# Patient Record
Sex: FEMALE | Race: WHITE | Employment: UNEMPLOYED | URBAN - METROPOLITAN AREA
[De-identification: names, ages, dates, MRNs, and addresses within clinical notes are randomized per-mention and may not be internally consistent; named-entity substitution may affect disease eponyms.]

---

## 2019-08-21 LAB
CHLAMYDIA, EXTERNAL: NEGATIVE
HBSAG, EXTERNAL: NEGATIVE
HIV, EXTERNAL: NEGATIVE
N. GONORRHEA, EXTERNAL: NEGATIVE
RPR, EXTERNAL: NONREACTIVE
RUBELLA, EXTERNAL: NORMAL
TYPE, ABO & RH, EXTERNAL: NORMAL

## 2019-11-22 ENCOUNTER — HOSPITAL ENCOUNTER (OUTPATIENT)
Age: 34
Discharge: HOME | End: 2019-11-22
Attending: OBSTETRICS & GYNECOLOGY | Admitting: OBSTETRICS & GYNECOLOGY
Payer: MEDICAID

## 2019-11-22 VITALS
TEMPERATURE: 97.7 F | RESPIRATION RATE: 18 BRPM | WEIGHT: 142 LBS | BODY MASS INDEX: 21.03 KG/M2 | HEIGHT: 69 IN | HEART RATE: 126 BPM | OXYGEN SATURATION: 99 % | DIASTOLIC BLOOD PRESSURE: 70 MMHG | SYSTOLIC BLOOD PRESSURE: 123 MMHG

## 2019-11-22 PROBLEM — Z34.90 PREGNANCY: Status: ACTIVE | Noted: 2019-11-22

## 2019-11-22 LAB
APPEARANCE UR: CLEAR
BILIRUB UR QL: NEGATIVE
COLOR UR: YELLOW
GLUCOSE UR QL STRIP.AUTO: NEGATIVE MG/DL
KETONES UR-MCNC: NEGATIVE MG/DL
LEUKOCYTE ESTERASE UR QL STRIP: NEGATIVE
NITRITE UR QL: NEGATIVE
PH UR: 7 [PH] (ref 5–9)
PROT UR QL: NEGATIVE MG/DL
RBC # UR STRIP: ABNORMAL /UL
SERVICE CMNT-IMP: ABNORMAL
SP GR UR: 1.01 (ref 1–1.02)
UROBILINOGEN UR QL: 0.2 EU/DL (ref 0.2–1)

## 2019-11-22 PROCEDURE — 74011000258 HC RX REV CODE- 258: Performed by: MIDWIFE

## 2019-11-22 PROCEDURE — 99283 EMERGENCY DEPT VISIT LOW MDM: CPT

## 2019-11-22 PROCEDURE — 81003 URINALYSIS AUTO W/O SCOPE: CPT

## 2019-11-22 PROCEDURE — 96365 THER/PROPH/DIAG IV INF INIT: CPT

## 2019-11-22 PROCEDURE — 74011250636 HC RX REV CODE- 250/636: Performed by: MIDWIFE

## 2019-11-22 PROCEDURE — 59025 FETAL NON-STRESS TEST: CPT

## 2019-11-22 RX ORDER — DEXTROSE MONOHYDRATE AND SODIUM CHLORIDE 5; .9 G/100ML; G/100ML
125 INJECTION, SOLUTION INTRAVENOUS CONTINUOUS
Status: DISCONTINUED | OUTPATIENT
Start: 2019-11-22 | End: 2019-11-23 | Stop reason: HOSPADM

## 2019-11-22 RX ADMIN — SODIUM CHLORIDE, SODIUM LACTATE, POTASSIUM CHLORIDE, AND CALCIUM CHLORIDE 1000 ML: 600; 310; 30; 20 INJECTION, SOLUTION INTRAVENOUS at 20:29

## 2019-11-22 RX ADMIN — DEXTROSE MONOHYDRATE AND SODIUM CHLORIDE 125 ML/HR: 5; .9 INJECTION, SOLUTION INTRAVENOUS at 22:40

## 2019-11-23 NOTE — PROGRESS NOTES
HPI: 31 yo [id number] presents for fever at 1700 relieved with tylenol and diarrhea today. Diarrhea started this afternoon after eating olson and eggs with breakfast. Patient also states feeling unwell. All of her children have recently been ill with cold symptoms, only one with fever, denies any influenza contacts. Has not received flu shot. Subjective:     Tobin Nichols, 30 y.o.   at 34w4d presents to L&D with c/o GI illness. Assessment:  History reviewed. No pertinent past medical history. Past Surgical History:   Procedure Laterality Date    HX  SECTION         No Known Allergies  Prior to Admission medications    Not on File        Objective:     Vitals:  Vitals:    19   BP:       Pulse:       Resp:       Temp:       SpO2: 97% 99% 100% 99%   Weight:       Height:            Physical Exam:  Heart: Regular rate and rhythm, S1S2 present or without murmur or extra heart sounds  Lung: clear to auscultation throughout lung fields, no wheezes, no rales, no rhonchi and normal respiratory effort  Back: costovertebral angle tenderness absent  Abdomen: soft, nontender, gravid  Fundus: soft and non tender  Lower Extremities:  - Edema No  Membranes:  Intact  Fetal Heart Rate: Reactive  Baseline: 135  Variability: Moderate  Accels: Present  Decels: None    Cervical exam: Dilated:  1 cm                             Effacement: 0%                             Station: Floating  U/A: Urine dipstick shows negative for all components. Assessment/Plan: Suspected Gastroenteritis     Plan: DC home with standard & ER labor precautions after IVF given. Clear liquid x24 hours and advance diet as tolerated. Follow-up in office for routine visit.      Signed By:  Bebo Haro CNM     2019

## 2019-11-23 NOTE — PROGRESS NOTES
2010: Pt is a 30 y.o.  at 34w4d, arrived to L&D triage with c/o of cramping/ctx that are irregular and rates 1-5/10 when they happen, fever today (101F) took tylenol 3hrs ago and afebrile in tiage (97.7F), muscle aches, tiredness, diarrhea, reports all her children have been sick recently but non diagnosed with the flu jsut colds. denies LOF and VB. Reports +FM  EFM and TOCO applied, call bell within reach. SVE 1/thick/high    2020: SBAR to [employer], orders received, IVF bolus    2245: verbal orders for patient to received 500mL of D5NS. 2310: CNM have reviewed discharge instructions with the patient. The patient verbalized understanding. 2315: pt received all 500mL of D5NS.     2330: pt ambulated off unit in stable condition

## 2019-11-23 NOTE — DISCHARGE INSTRUCTIONS
Pregnancy Precautions: Care Instructions  Your Care Instructions    There is no sure way to prevent labor before your due date ( labor) or to prevent most other pregnancy problems. But there are things you can do to increase your chances of a healthy pregnancy. Go to your appointments, follow your doctor's advice, and take good care of yourself. Eat well, and exercise (if your doctor agrees). And make sure to drink plenty of water. Follow-up care is a key part of your treatment and safety. Be sure to make and go to all appointments, and call your doctor if you are having problems. It's also a good idea to know your test results and keep a list of the medicines you take. How can you care for yourself at home? · Make sure you go to your prenatal appointments. At each visit, your doctor will check your blood pressure. Your doctor will also check to see if you have protein in your urine. High blood pressure and protein in urine are signs of preeclampsia. This condition can be dangerous for you and your baby. · Drink plenty of fluids, enough so that your urine is light yellow or clear like water. Dehydration can cause contractions. If you have kidney, heart, or liver disease and have to limit fluids, talk with your doctor before you increase the amount of fluids you drink. · Tell your doctor right away if you notice any symptoms of an infection, such as:  ? Burning when you urinate. ? A foul-smelling discharge from your vagina. ? Vaginal itching. ? Unexplained fever. ? Unusual pain or soreness in your uterus or lower belly. · Eat a balanced diet. Include plenty of foods that are high in calcium and iron. ? Foods high in calcium include milk, cheese, yogurt, almonds, and broccoli. ? Foods high in iron include red meat, shellfish, poultry, eggs, beans, raisins, whole-grain bread, and leafy green vegetables. · Do not smoke.  If you need help quitting, talk to your doctor about stop-smoking programs and medicines. These can increase your chances of quitting for good. · Do not drink alcohol or use illegal drugs. · Follow your doctor's directions about activity. Your doctor will let you know how much, if any, exercise you can do. · Ask your doctor if you can have sex. If you are at risk for early labor, your doctor may ask you to not have sex. · Take care to prevent falls. During pregnancy, your joints are loose, and your balance is off. Sports such as bicycling, skiing, or in-line skating can increase your risk of falling. And don't ride horses or motorcycles, dive, water ski, scuba dive, or parachute jump while you are pregnant. · Avoid getting very hot. Do not use saunas or hot tubs. Avoid staying out in the sun in hot weather for long periods. Take acetaminophen (Tylenol) to lower a high fever. · Do not take any over-the-counter or herbal medicines or supplements without talking to your doctor or pharmacist first.  When should you call for help? Call 911 anytime you think you may need emergency care. For example, call if:    · You passed out (lost consciousness).     · You have a seizure.     · You have severe vaginal bleeding.     · You have severe pain in your belly or pelvis.     · You have had fluid gushing or leaking from your vagina and you know or think the umbilical cord is bulging into your vagina. If this happens, immediately get down on your knees so your rear end (buttocks) is higher than your head. This will decrease the pressure on the cord until help arrives.   [facility] your doctor now or seek immediate medical care if:    · You have signs of preeclampsia, such as:  ? Sudden swelling of your face, hands, or feet. ? New vision problems (such as dimness, blurring, or seeing spots). ? A severe headache.     · You have any vaginal bleeding.     · You have belly pain or cramping.     · You have a fever.     · You have had regular contractions (with or without pain) for an hour.  This means that you have 8 or more within 1 hour or 4 or more in 20 minutes after you change your position and drink fluids.     · You have a sudden release of fluid from your vagina.     · You have low back pain or pelvic pressure that does not go away.     · You notice that your baby has stopped moving or is moving much less than normal.    Watch closely for changes in your health, and be sure to contact your doctor if you have any problems. Where can you learn more? Go to http://talisha-john.info/. Enter [id number] in the search box to learn more about \"Pregnancy Precautions: Care Instructions. \"  Current as of: May 29, 2019  Content Version: 12.2  © 3860-6268 [employer]. Care instructions adapted under license by [employer] (which disclaims liability or warranty for this information). If you have questions about a medical condition or this instruction, always ask your healthcare professional. [address] any warranty or liability for your use of this information. Cristina Nano Contractions: Care Instructions  Your Care Instructions    Luis Felipe Davis contractions prepare your uterus for labor. Think of them as a \"warm-up\" exercise that your body does. You may begin to feel them between the 28th and 30th weeks of your pregnancy. But they start as early as the 20th week. Luis Felipe Davis contractions usually occur more often during the ninth month. They may go away when you are active and return when you rest. These contractions are like mild contractions of true labor, but they occur less often. (You feel fewer than 8 in an hour.) They don't cause your cervix to open. It may be hard for you to tell the difference between Cristina Nano contractions and true labor, especially in your first pregnancy. Follow-up care is a key part of your treatment and safety. Be sure to make and go to all appointments, and call your doctor if you are having problems.  It's also a good idea to know your test results and keep a list of the medicines you take. How can you care for yourself at home? · Try a warm bath to help relieve muscle tension and reduce pain. · Change positions every 30 minutes. Take breaks if you must sit for a long time. Get up and walk around. · Drink plenty of water, enough so that your urine is light yellow or clear like water. · Taking short walks may help you feel better. Your doctor needs to check any contractions that are getting stronger or closer together. Where can you learn more? Go to http://talishaShopItjohn.info/. Enter [id number] in the search box to learn more about \"Luis Felipe Davis Contractions: Care Instructions. \"  Current as of: May 29, 2019  Content Version: 12.2  © 7983-7091 [employer]. Care instructions adapted under license by [employer] (which disclaims liability or warranty for this information). If you have questions about a medical condition or this instruction, always ask your healthcare professional. [place] [address] any warranty or liability for your use of this information. Counting Your Baby's Kicks: Care Instructions  Your Care Instructions    Counting your baby's kicks is one way your doctor can tell that your baby is healthy. Most women--especially in a first pregnancy--feel their baby move for the first time between 16 and 22 weeks. The movement may feel like flutters rather than kicks. Your baby may move more at certain times of the day. When you are active, you may notice less kicking than when you are resting. At your prenatal visits, your doctor will ask whether the baby is active. In your last trimester, your doctor may ask you to count the number of times you feel your baby move. Follow-up care is a key part of your treatment and safety. Be sure to make and go to all appointments, and call your doctor if you are having problems.  It's also a good idea to know your test results and keep a list of the medicines you take. How do you count fetal kicks? · A common method of checking your baby's movement is to count the number of kicks or moves you feel in 1 hour. Ten movements (such as kicks, flutters, or rolls) in 1 hour are normal. Some doctors suggest that you count in the morning until you get to 10 movements. Then you can quit for that day and start again the next day. · Pick your baby's most active time of day to count. This may be any time from morning to evening. · If you do not feel 10 movements in an hour, your baby may be sleeping. Wait for the next hour and count again. When should you call for help? Call your doctor now or seek immediate medical care if:    · You noticed that your baby has stopped moving or is moving much less than normal.    Watch closely for changes in your health, and be sure to contact your doctor if you have any problems. Where can you learn more? Go to http://talisha-john.info/. Enter C641 in the search box to learn more about \"Counting Your Baby's Kicks: Care Instructions. \"  Current as of: May 29, 2019  Content Version: 12.2  © 5733-6968 [employer]. Care instructions adapted under license by [employer] (which disclaims liability or warranty for this information). If you have questions about a medical condition or this instruction, always ask your healthcare professional. [place] [address] any warranty or liability for your use of this information.

## 2019-12-31 ENCOUNTER — ANESTHESIA EVENT (OUTPATIENT)
Dept: LABOR AND DELIVERY | Age: 34
DRG: 560 | End: 2019-12-31
Payer: MEDICAID

## 2019-12-31 ENCOUNTER — HOSPITAL ENCOUNTER (INPATIENT)
Age: 34
LOS: 1 days | Discharge: HOME | DRG: 560 | End: 2020-01-01
Attending: OBSTETRICS & GYNECOLOGY | Admitting: OBSTETRICS & GYNECOLOGY
Payer: MEDICAID

## 2019-12-31 ENCOUNTER — ANESTHESIA (OUTPATIENT)
Dept: LABOR AND DELIVERY | Age: 34
DRG: 560 | End: 2019-12-31
Payer: MEDICAID

## 2019-12-31 LAB
ABO + RH BLD: NORMAL
AMPHET UR QL SCN: NEGATIVE
BARBITURATES UR QL SCN: NEGATIVE
BASOPHILS # BLD: 0 K/UL (ref 0–0.1)
BASOPHILS NFR BLD: 0 % (ref 0–3)
BENZODIAZ UR QL: NEGATIVE
BLOOD GROUP ANTIBODIES SERPL: NORMAL
CANNABINOIDS UR QL SCN: NEGATIVE
COCAINE UR QL SCN: NEGATIVE
DIFFERENTIAL METHOD BLD: ABNORMAL
EOSINOPHIL # BLD: 0.6 K/UL (ref 0–0.4)
EOSINOPHIL NFR BLD: 3 % (ref 0–5)
ERYTHROCYTE [DISTWIDTH] IN BLOOD BY AUTOMATED COUNT: 15.2 % (ref 11.6–14.5)
HCT VFR BLD AUTO: 35 % (ref 35–45)
HDSCOM,HDSCOM: NORMAL
HGB BLD-MCNC: 11.5 G/DL (ref 12–16)
LYMPHOCYTES # BLD: 3.5 K/UL (ref 0.8–3.5)
LYMPHOCYTES NFR BLD: 17 % (ref 20–51)
MCH RBC QN AUTO: 28 PG (ref 24–34)
MCHC RBC AUTO-ENTMCNC: 32.9 G/DL (ref 31–37)
MCV RBC AUTO: 85.2 FL (ref 74–97)
METAMYELOCYTES NFR BLD MANUAL: 1 %
METHADONE UR QL: NEGATIVE
MONOCYTES # BLD: 1.2 K/UL (ref 0–1)
MONOCYTES NFR BLD: 6 % (ref 2–9)
NEUTS BAND NFR BLD MANUAL: 3 % (ref 0–5)
NEUTS SEG # BLD: 14.4 K/UL (ref 1.8–8)
NEUTS SEG NFR BLD: 70 % (ref 42–75)
OPIATES UR QL: NEGATIVE
PCP UR QL: NEGATIVE
PLATELET # BLD AUTO: 395 K/UL (ref 135–420)
PMV BLD AUTO: 9.6 FL (ref 9.2–11.8)
RBC # BLD AUTO: 4.11 M/UL (ref 4.2–5.3)
RBC MORPH BLD: ABNORMAL
SPECIMEN EXP DATE BLD: NORMAL
WBC # BLD AUTO: 20.6 K/UL (ref 4.6–13.2)
WBC MORPH BLD: ABNORMAL

## 2019-12-31 PROCEDURE — 77030007879 HC KT SPN EPDRL TELE -B: Performed by: ANESTHESIOLOGY

## 2019-12-31 PROCEDURE — 74011000258 HC RX REV CODE- 258: Performed by: ADVANCED PRACTICE MIDWIFE

## 2019-12-31 PROCEDURE — 75410000000 HC DELIVERY VAGINAL/SINGLE

## 2019-12-31 PROCEDURE — 77030040830 HC CATH URETH FOL MDII -A

## 2019-12-31 PROCEDURE — 76060000078 HC EPIDURAL ANESTHESIA

## 2019-12-31 PROCEDURE — 74011250636 HC RX REV CODE- 250/636: Performed by: ANESTHESIOLOGY

## 2019-12-31 PROCEDURE — 74011000250 HC RX REV CODE- 250: Performed by: ANESTHESIOLOGY

## 2019-12-31 PROCEDURE — 74011250636 HC RX REV CODE- 250/636: Performed by: OBSTETRICS & GYNECOLOGY

## 2019-12-31 PROCEDURE — 75410000002 HC LABOR FEE PER 1 HR

## 2019-12-31 PROCEDURE — 86900 BLOOD TYPING SEROLOGIC ABO: CPT

## 2019-12-31 PROCEDURE — 65270000029 HC RM PRIVATE

## 2019-12-31 PROCEDURE — 85025 COMPLETE CBC W/AUTO DIFF WBC: CPT

## 2019-12-31 PROCEDURE — 74011250636 HC RX REV CODE- 250/636: Performed by: ADVANCED PRACTICE MIDWIFE

## 2019-12-31 PROCEDURE — 80307 DRUG TEST PRSMV CHEM ANLYZR: CPT

## 2019-12-31 PROCEDURE — 74011250636 HC RX REV CODE- 250/636

## 2019-12-31 PROCEDURE — 77010026065 HC OXYGEN MINIMUM MEDICAL AIR

## 2019-12-31 PROCEDURE — 4A1HXCZ MONITORING OF PRODUCTS OF CONCEPTION, CARDIAC RATE, EXTERNAL APPROACH: ICD-10-PCS | Performed by: OBSTETRICS & GYNECOLOGY

## 2019-12-31 PROCEDURE — 75410000003 HC RECOV DEL/VAG/CSECN EA 0.5 HR

## 2019-12-31 PROCEDURE — 00HU33Z INSERTION OF INFUSION DEVICE INTO SPINAL CANAL, PERCUTANEOUS APPROACH: ICD-10-PCS | Performed by: ANESTHESIOLOGY

## 2019-12-31 RX ORDER — OXYTOCIN/0.9 % SODIUM CHLORIDE 30/500 ML
PLASTIC BAG, INJECTION (ML) INTRAVENOUS
Status: COMPLETED
Start: 2019-12-31 | End: 2019-12-31

## 2019-12-31 RX ORDER — ZOLPIDEM TARTRATE 5 MG/1
5 TABLET ORAL
Status: DISCONTINUED | OUTPATIENT
Start: 2019-12-31 | End: 2020-01-01 | Stop reason: HOSPADM

## 2019-12-31 RX ORDER — NALBUPHINE HYDROCHLORIDE 10 MG/ML
10 INJECTION, SOLUTION INTRAMUSCULAR; INTRAVENOUS; SUBCUTANEOUS
Status: DISCONTINUED | OUTPATIENT
Start: 2019-12-31 | End: 2019-12-31 | Stop reason: HOSPADM

## 2019-12-31 RX ORDER — FENTANYL CITRATE 50 UG/ML
INJECTION, SOLUTION INTRAMUSCULAR; INTRAVENOUS AS NEEDED
Status: DISCONTINUED | OUTPATIENT
Start: 2019-12-31 | End: 2019-12-31 | Stop reason: HOSPADM

## 2019-12-31 RX ORDER — ONDANSETRON 2 MG/ML
4 INJECTION INTRAMUSCULAR; INTRAVENOUS
Status: DISCONTINUED | OUTPATIENT
Start: 2019-12-31 | End: 2019-12-31 | Stop reason: HOSPADM

## 2019-12-31 RX ORDER — FENTANYL/ROPIVACAINE/NS/PF 2MCG/ML-.1
1-15 PLASTIC BAG, INJECTION (ML) EPIDURAL CONTINUOUS
Status: DISCONTINUED | OUTPATIENT
Start: 2019-12-31 | End: 2019-12-31 | Stop reason: HOSPADM

## 2019-12-31 RX ORDER — LIDOCAINE HYDROCHLORIDE AND EPINEPHRINE 15; 5 MG/ML; UG/ML
INJECTION, SOLUTION EPIDURAL AS NEEDED
Status: DISCONTINUED | OUTPATIENT
Start: 2019-12-31 | End: 2019-12-31 | Stop reason: HOSPADM

## 2019-12-31 RX ORDER — MINERAL OIL
30 OIL (ML) ORAL AS NEEDED
Status: DISCONTINUED | OUTPATIENT
Start: 2019-12-31 | End: 2019-12-31 | Stop reason: HOSPADM

## 2019-12-31 RX ORDER — FENTANYL CITRATE 50 UG/ML
INJECTION, SOLUTION INTRAMUSCULAR; INTRAVENOUS
Status: COMPLETED
Start: 2019-12-31 | End: 2019-12-31

## 2019-12-31 RX ORDER — OXYTOCIN/0.9 % SODIUM CHLORIDE 20/1000 ML
125 PLASTIC BAG, INJECTION (ML) INTRAVENOUS CONTINUOUS
Status: DISCONTINUED | OUTPATIENT
Start: 2019-12-31 | End: 2019-12-31 | Stop reason: HOSPADM

## 2019-12-31 RX ORDER — LIDOCAINE HYDROCHLORIDE 10 MG/ML
20 INJECTION, SOLUTION EPIDURAL; INFILTRATION; INTRACAUDAL; PERINEURAL AS NEEDED
Status: DISCONTINUED | OUTPATIENT
Start: 2019-12-31 | End: 2019-12-31 | Stop reason: HOSPADM

## 2019-12-31 RX ORDER — HYDROMORPHONE HYDROCHLORIDE 1 MG/ML
1 INJECTION, SOLUTION INTRAMUSCULAR; INTRAVENOUS; SUBCUTANEOUS
Status: DISCONTINUED | OUTPATIENT
Start: 2019-12-31 | End: 2019-12-31 | Stop reason: HOSPADM

## 2019-12-31 RX ORDER — AMOXICILLIN 250 MG
1 CAPSULE ORAL
Status: DISCONTINUED | OUTPATIENT
Start: 2019-12-31 | End: 2020-01-01 | Stop reason: HOSPADM

## 2019-12-31 RX ORDER — IBUPROFEN 400 MG/1
800 TABLET ORAL
Status: DISCONTINUED | OUTPATIENT
Start: 2019-12-31 | End: 2020-01-01 | Stop reason: HOSPADM

## 2019-12-31 RX ORDER — OXYCODONE AND ACETAMINOPHEN 5; 325 MG/1; MG/1
2 TABLET ORAL
Status: DISCONTINUED | OUTPATIENT
Start: 2019-12-31 | End: 2020-01-01 | Stop reason: HOSPADM

## 2019-12-31 RX ORDER — SODIUM CHLORIDE, SODIUM LACTATE, POTASSIUM CHLORIDE, CALCIUM CHLORIDE 600; 310; 30; 20 MG/100ML; MG/100ML; MG/100ML; MG/100ML
125 INJECTION, SOLUTION INTRAVENOUS CONTINUOUS
Status: DISCONTINUED | OUTPATIENT
Start: 2019-12-31 | End: 2019-12-31 | Stop reason: HOSPADM

## 2019-12-31 RX ORDER — SODIUM CHLORIDE 0.9 % (FLUSH) 0.9 %
5-40 SYRINGE (ML) INJECTION EVERY 8 HOURS
Status: DISCONTINUED | OUTPATIENT
Start: 2019-12-31 | End: 2019-12-31 | Stop reason: HOSPADM

## 2019-12-31 RX ORDER — TERBUTALINE SULFATE 1 MG/ML
0.25 INJECTION SUBCUTANEOUS
Status: DISCONTINUED | OUTPATIENT
Start: 2019-12-31 | End: 2019-12-31 | Stop reason: HOSPADM

## 2019-12-31 RX ORDER — PHENYLEPHRINE HCL IN 0.9% NACL 1 MG/10 ML
80 SYRINGE (ML) INTRAVENOUS AS NEEDED
Status: DISCONTINUED | OUTPATIENT
Start: 2019-12-31 | End: 2019-12-31 | Stop reason: HOSPADM

## 2019-12-31 RX ORDER — ACETAMINOPHEN 325 MG/1
650 TABLET ORAL
Status: DISCONTINUED | OUTPATIENT
Start: 2019-12-31 | End: 2020-01-01 | Stop reason: HOSPADM

## 2019-12-31 RX ORDER — FENTANYL/ROPIVACAINE/NS/PF 2MCG/ML-.1
PLASTIC BAG, INJECTION (ML) EPIDURAL
Status: DISPENSED
Start: 2019-12-31 | End: 2019-12-31

## 2019-12-31 RX ORDER — SODIUM CHLORIDE 0.9 % (FLUSH) 0.9 %
5-40 SYRINGE (ML) INJECTION AS NEEDED
Status: DISCONTINUED | OUTPATIENT
Start: 2019-12-31 | End: 2019-12-31 | Stop reason: HOSPADM

## 2019-12-31 RX ORDER — BUTORPHANOL TARTRATE 2 MG/ML
2 INJECTION INTRAMUSCULAR; INTRAVENOUS
Status: DISCONTINUED | OUTPATIENT
Start: 2019-12-31 | End: 2019-12-31 | Stop reason: HOSPADM

## 2019-12-31 RX ORDER — LIDOCAINE HYDROCHLORIDE AND EPINEPHRINE 15; 5 MG/ML; UG/ML
INJECTION, SOLUTION EPIDURAL
Status: COMPLETED | OUTPATIENT
Start: 2019-12-31 | End: 2019-12-31

## 2019-12-31 RX ORDER — NALOXONE HYDROCHLORIDE 0.4 MG/ML
0.2 INJECTION, SOLUTION INTRAMUSCULAR; INTRAVENOUS; SUBCUTANEOUS AS NEEDED
Status: DISCONTINUED | OUTPATIENT
Start: 2019-12-31 | End: 2019-12-31 | Stop reason: HOSPADM

## 2019-12-31 RX ORDER — METHYLERGONOVINE MALEATE 0.2 MG/ML
0.2 INJECTION INTRAVENOUS AS NEEDED
Status: DISCONTINUED | OUTPATIENT
Start: 2019-12-31 | End: 2019-12-31 | Stop reason: HOSPADM

## 2019-12-31 RX ORDER — OXYTOCIN/0.9 % SODIUM CHLORIDE 20/1000 ML
999 PLASTIC BAG, INJECTION (ML) INTRAVENOUS ONCE
Status: DISPENSED | OUTPATIENT
Start: 2019-12-31 | End: 2019-12-31

## 2019-12-31 RX ORDER — LIDOCAINE HYDROCHLORIDE 10 MG/ML
INJECTION INFILTRATION; PERINEURAL AS NEEDED
Status: DISCONTINUED | OUTPATIENT
Start: 2019-12-31 | End: 2019-12-31 | Stop reason: HOSPADM

## 2019-12-31 RX ORDER — PROMETHAZINE HYDROCHLORIDE 25 MG/ML
25 INJECTION, SOLUTION INTRAMUSCULAR; INTRAVENOUS
Status: DISCONTINUED | OUTPATIENT
Start: 2019-12-31 | End: 2020-01-01 | Stop reason: HOSPADM

## 2019-12-31 RX ORDER — FENTANYL CITRATE 50 UG/ML
100 INJECTION, SOLUTION INTRAMUSCULAR; INTRAVENOUS ONCE
Status: ACTIVE | OUTPATIENT
Start: 2019-12-31 | End: 2019-12-31

## 2019-12-31 RX ORDER — OXYTOCIN/0.9 % SODIUM CHLORIDE 30/500 ML
0-6 PLASTIC BAG, INJECTION (ML) INTRAVENOUS
Status: DISCONTINUED | OUTPATIENT
Start: 2019-12-31 | End: 2020-01-01 | Stop reason: HOSPADM

## 2019-12-31 RX ADMIN — FENTANYL CITRATE 100 MCG: 50 INJECTION, SOLUTION INTRAMUSCULAR; INTRAVENOUS at 04:27

## 2019-12-31 RX ADMIN — LIDOCAINE HYDROCHLORIDE,EPINEPHRINE BITARTRATE 3 ML: 15; .005 INJECTION, SOLUTION EPIDURAL; INFILTRATION; INTRACAUDAL; PERINEURAL at 04:27

## 2019-12-31 RX ADMIN — SODIUM CHLORIDE, SODIUM LACTATE, POTASSIUM CHLORIDE, AND CALCIUM CHLORIDE 125 ML/HR: 600; 310; 30; 20 INJECTION, SOLUTION INTRAVENOUS at 12:34

## 2019-12-31 RX ADMIN — ROPIVACAINE HYDROCHLORIDE 12 ML/HR: 10 INJECTION, SOLUTION EPIDURAL at 10:45

## 2019-12-31 RX ADMIN — SODIUM CHLORIDE, SODIUM LACTATE, POTASSIUM CHLORIDE, AND CALCIUM CHLORIDE 125 ML/HR: 600; 310; 30; 20 INJECTION, SOLUTION INTRAVENOUS at 07:18

## 2019-12-31 RX ADMIN — Medication 125 ML/HR: at 16:09

## 2019-12-31 RX ADMIN — SODIUM CHLORIDE 5 MILLION UNITS: 900 INJECTION INTRAVENOUS at 04:48

## 2019-12-31 RX ADMIN — LIDOCAINE HYDROCHLORIDE,EPINEPHRINE BITARTRATE 2 ML: 15; .005 INJECTION, SOLUTION EPIDURAL; INFILTRATION; INTRACAUDAL; PERINEURAL at 04:25

## 2019-12-31 RX ADMIN — ROPIVACAINE HYDROCHLORIDE 12 ML/HR: 10 INJECTION, SOLUTION EPIDURAL at 04:31

## 2019-12-31 RX ADMIN — PENICILLIN G POTASSIUM 2.5 MILLION UNITS: 20000000 POWDER, FOR SOLUTION INTRAVENOUS at 09:34

## 2019-12-31 RX ADMIN — Medication 2 MILLI-UNITS/MIN: at 14:47

## 2019-12-31 RX ADMIN — LIDOCAINE HYDROCHLORIDE 5 ML: 10 INJECTION, SOLUTION INFILTRATION; PERINEURAL at 04:21

## 2019-12-31 RX ADMIN — SODIUM CHLORIDE, SODIUM LACTATE, POTASSIUM CHLORIDE, AND CALCIUM CHLORIDE 500 ML: 600; 310; 30; 20 INJECTION, SOLUTION INTRAVENOUS at 03:41

## 2019-12-31 RX ADMIN — PENICILLIN G POTASSIUM 2.5 MILLION UNITS: 20000000 POWDER, FOR SOLUTION INTRAVENOUS at 13:45

## 2019-12-31 NOTE — PROGRESS NOTES
7831 - Patient arrived to unit via ambulation as a  at 40.1 weeks with complaints of contractions. Patient denies LOF, vaginal bleeding. Patient states contractions every 3 minutes. Patient taken to LR3 and given gown to change. 0315 -  SVE 6/70-80/-1, membranes intact; EFM and TOCO applied. Assessment complete.   0325 - BUDDY Barnes CNM notified of arrival and SVE. Orders for admission. 3465 - patient request epidural  0407 - Nini Esparza present on unit  0412 - Dr. Darline Souza at bedside for epidural placement. Procedure explained to include risks and benefits. Verbalizes understanding. All questions answered. 4918 - Sitting up on side of bed. Position reviewed. 2670 - Time out  0425 - Test dose  0426 - Epidural placed   0427 - Test dose  0429 - Loading dosed. Fentanyl 100 mcg given by Dr. Darline Souza through the epidural catheter. 1532 - Assisted back to bed after epidural placement. Tolerated procedure well. Vitals signs stable. Monitors adjusted. 0448 - PCN 5mu dose started  0545 - Day inserted; SVE 7/90/-1, intact membranes  0710 - Bedside and Verbal shift change report given to BUDDY Abel RN (oncoming nurse) by Olga Wright RN (offgoing nurse). Report included the following information SBAR, Kardex, Intake/Output, MAR and Recent Results.

## 2019-12-31 NOTE — L&D DELIVERY NOTE
Patient:  Abbot                   Delivery Summary    Circumcision:   desires    Information for the patient's :  Orlando Agosto [[id number]]     Delivery Type: Vaginal, Spontaneous   Delivery Date: 2019   Delivery Time: 4:04 PM     Birth Weight:       Sex:  male  Delivery Clinician:  Naheed Chatterjee   Gestational Age: 44w3d    Presentation: Vertex   Position: Left  Occiput  Anterior     Apgars were 8  and 9      Resuscitation Method: Suctioning-bulb; Tactile Stimulation     Meconium Stained: None    Living Status: Living       Placenta Date/Time: 2019  4:10 PM   Placenta Removal: Spontaneous   Placenta Appearance: Normal    Cord Information: 3 Vessels    Cord Events: Nuchal Cord With Compressions       Disposition of Cord Blood: Discard    Blood Gases Sent?:  No       Cord pH:      Summoned to room for patient feeling urge to push and completely dilated. With strong maternal effort, infant head delivered GREYSON, restituted, and loose nuchal cord was easily reduced. Right anterior shoulder delivered without difficulty. Vigorous, male infant was placed immediately on maternal abdomen and was dried, stimulated, and bulb suctioned. Once cord stopped pulsating, cord was double clamped and FOB cut the cord inbetween the clamps. Postpartum IV pitocin bolus initiated. Cord DNA collected. Placenta delivered spontaneously, salazar, intact with 3 vessel cord. Multiple placental calcifications noted. Bleeding well controlled and fundus 2 below umbilicus, midline and firm. Perineum inspected and no lacerations in need of repair. Will begin routine  and postpartum care.       Episiotomy: None  Laceration(s): None    Estimated Blood Loss (ml): 150    Labor Events  Method:      Augmentation:   Cervical Ripening:        Induction - no    Induction Indication - N/A    Induction Method - N/A    Complications - none    NICU Admit - no    HTN Disorders - none    Diabetes - N/A    Operative Vaginal Delivery - none    Additional Delivery Comments - noneDelivery Summary    Patient: Roseline Smith MRN: [id number]  SSN: [id number]    YOB: 1985  Age: 30 y.o. Sex: female       Information for the patient's :  Felix Larkin [[id number]]       Labor Events:    Labor: No    Steroids:     Cervical Ripening Date/Time:       Cervical Ripening Type: None   Antibiotics During Labor: Yes   Rupture Identifier:      Rupture Date/Time: 2019 11:45 AM   Rupture Type: AROM   Amniotic Fluid Volume: Moderate    Amniotic Fluid Description: Clear    Amniotic Fluid Odor: None    Induction: None       Induction Date/Time:        Indications for Induction:      Augmentation: Oxytocin   Augmentation Date/Time:      Indications for Augmentation: Ineffective Contraction Pattern   Labor complications: None       Additional complications:        Delivery Events:  Indications For Episiotomy:     Episiotomy: None   Perineal Laceration(s): None   Repaired:     Periurethral Laceration Location:      Repaired:     Labial Laceration Location:     Repaired:     Sulcal Laceration Location:     Repaired:     Vaginal Laceration Location:     Repaired:     Cervical Laceration Location:     Repaired:     Repair Suture: None   Number of Repair Packets:     Estimated Blood Loss (ml): 150ml     Delivery Date: 2019    Delivery Time: 4:04 PM  Delivery Type: Vaginal, Spontaneous  Sex:  Male    Gestational Age: 44w3d   Delivery Clinician:  Gallito Moser  Living Status: Living   Delivery Location: L&D BR3          APGARS  One minute Five minutes Ten minutes   Skin color: 0   1        Heart rate: 2   2        Grimace: 2   2        Muscle tone: 2   2        Breathin   2        Totals: 8   9            Presentation: Vertex    Position: Left Occiput Anterior  Resuscitation Method:  Suctioning-bulb; Tactile Stimulation     Meconium Stained: None      Cord Information: 3 Vessels  Complications: Nuchal Cord With Compressions  Cord around: head  Delayed cord clamping? Yes  Cord clamped date/time:   Disposition of Cord Blood: Discard    Blood Gases Sent?: No    Placenta:  Date/Time: 2019  4:10 PM  Removal: Spontaneous      Appearance: Normal      Measurements:  Birth Weight:        Birth Length:        Head Circumference:        Chest Circumference:       Abdominal Girth: Other Providers:   ;;;;;;;;Verita Agent, Obstetrician;Primary Nurse;Primary  Nurse;Nicu Nurse;Neonatologist;Anesthesiologist;Crna;Nurse Practitioner;Midwife;Nursery Nurse           Group B Strep: No results found for: GRBSEXT, GRBSEXT  Information for the patient's :  Pacheco Lr [[id number]]   No results found for: ABORH, PCTABR, PCTDIG, BILI, ABORHEXT, ABORH    No results for input(s): PCO2CB, PO2CB, HCO3I, SO2I, IBD, PTEMPI, SPECTI, PHICB, ISITE, IDEV, IALLEN in the last 72 hours.

## 2019-12-31 NOTE — ANESTHESIA PREPROCEDURE EVALUATION
Relevant Problems   No relevant active problems       Anesthetic History   No history of anesthetic complications            Review of Systems / Medical History  Patient summary reviewed, nursing notes reviewed and pertinent labs reviewed    Pulmonary          Smoker         Neuro/Psych   Within defined limits           Cardiovascular  Within defined limits                Exercise tolerance: >4 METS     GI/Hepatic/Renal  Within defined limits              Endo/Other  Within defined limits           Other Findings            Physical Exam    Airway  Mallampati: II  TM Distance: 4 - 6 cm  Neck ROM: normal range of motion   Mouth opening: Normal     Cardiovascular  Regular rate and rhythm,  S1 and S2 normal,  no murmur, click, rub, or gallop  Rhythm: regular  Rate: normal         Dental  No notable dental hx       Pulmonary  Breath sounds clear to auscultation               Abdominal  GI exam deferred       Other Findings            Anesthetic Plan    ASA: 2  Anesthesia type: epidural            Anesthetic plan and risks discussed with: Patient      Risks of bleeding, infection, nerve injury, failed block, spinal tap causing headache and requiring epidural blood patch, back pain, and failed block discussed. Procedure described as elective. Pt understands and desires to proceed.

## 2019-12-31 NOTE — ANESTHESIA PROCEDURE NOTES
Epidural Block    Start time: 12/31/2019 4:18 AM  End time: 12/31/2019 4:33 AM  Performed by: Shad Cartwright MD  Authorized by: Shad Cartwright MD     Pre-Procedure  Indication: at surgeon's request and labor epidural    Preanesthetic Checklist: patient identified, risks and benefits discussed, anesthesia consent, site marked, patient being monitored, timeout performed and anesthesia consent    Timeout Time: 04:18        Epidural:   Patient position:  Seated  Prep region:  Lumbar  Location:  L3-4    Needle and Epidural Catheter:   Needle Type:  Tuohy  Needle Gauge:  17 G  Injection Technique:  Loss of resistance using saline  Attempts:  1  Catheter Size:  20 G  Catheter at Skin Depth (cm):  8.5  Depth in Epidural Space (cm):  5  Events: no blood with aspiration, no cerebrospinal fluid with aspiration, no paresthesia and negative aspiration test    Test Dose:  Negative    Assessment:   Catheter Secured:  Tegaderm and tape  Insertion:  Uncomplicated  Patient tolerance:  Patient tolerated the procedure well with no immediate complications  After epidural placement, patient without sensory or motor block. Patient denies headache or backache.

## 2019-12-31 NOTE — H&P
History & Physical    Name: Barry Capps MRN: [id number]  SSN: [id number]    YOB: 1985  Age: 30 y.o. Sex: female      Subjective:     Estimated Date of Delivery: 19  OB History    Para Term  AB Living   6 4 4   1 4   SAB TAB Ectopic Molar Multiple Live Births     1       4      # Outcome Date GA Lbr Beck/2nd Weight Sex Delivery Anes PTL Lv   6 Current            5 TAB 2018           4 Term 2015        PIETRO   3 Term         PIETRO   2 Term         PIETRO   1 Term      CS-LTranv   PIETRO       Ms. Emiliano Kim is admitted with pregnancy at 40w1d for labor. Prenatal course was complicated by smoking, anxiety (not on meds), insufficient prenatal care and has not been seen in 5 weeks, GBS unknown, and hx of c/s x1 followed by 3 normal vaginal deliveries. .  Please see prenatal records for details. History reviewed. No pertinent past medical history. Past Surgical History:   Procedure Laterality Date    HX  SECTION       Social History     Occupational History    Not on file   Tobacco Use    Smoking status: Current Every Day Smoker     Packs/day: 1.00    Smokeless tobacco: Never Used   Substance and Sexual Activity    Alcohol use: Not Currently    Drug use: Never    Sexual activity: Yes     Partners: Male     History reviewed. No pertinent family history. No Known Allergies  Prior to Admission medications    Not on File        Review of Systems: A comprehensive review of systems was negative except for that written in the History of Present Illness. Objective:     Vitals:  Vitals:    19 0945 19 1000 19 1015 19 1150   BP: 117/69 115/70 116/68    Pulse: 84 96 83    Resp:       Temp:    98.1 °F (36.7 °C)   SpO2:            Physical Exam:  Patient without distress.   Heart: Regular rate and rhythm, S1S2 present or without murmur or extra heart sounds  Lung: clear to auscultation throughout lung fields, no wheezes, no rales, no rhonchi and normal respiratory effort  Abdomen: soft, nontender, gravid  Fundus: soft and non tender  Perineum: blood absent, amniotic fluid present  Cervical Exam: 8-9//0  Lower Extremities: no erythema, warmth, tenderness, or edema   Membranes:  Artificial Rupture of Membranes; Amniotic Fluid: small amount of clear fluid  Fetal Heart Rate: Reactive          Prenatal Labs:   Lab Results   Component Value Date/Time    ABO/Rh(D) A POSITIVE 2019 03:30 AM    Rubella, External Immune 2019    HBsAg, External Negative 2019    HIV, External Negative 2019    RPR, External Nonreactive 2019    Gonorrhea, External Negative 2019    Chlamydia, External Negative 2019    ABO,Rh A+ 2019       Impression/Plan:     Active Problems:    * No active hospital problems. *       Plan: Admit for  labor. Group B Strep not tested but was given PCN per peds request. Anticipate . Expectant management. Epidural per anesthesia.

## 2019-12-31 NOTE — PROGRESS NOTES
Bedside and Verbal shift change report given to BUDDY Ledezma (oncoming nurse) by Bhupinder Hernandez, RN (offgoing nurse). Report included the following information SBAR, Kardex, Intake/Output, MAR and Recent Results. 0715- Pt and FOB resting with lights dimmed. Patient awake with voice and cooperative. Denies pain/pressure in vagina and rectum. Denies headache, blurry vision or disturbances. EFM and TOCO adjusted, assessment completed and VSS. Instructed to call with any change in pressure or any needs. 0815- Pt assisted into high ledesma's, EFM and TOCO adjusted. 0830- Unable to maintain EFM, patient placed right lateral with peanut ball. EFM adjusted. 12- C. Harmony Bottoms paged with update. CNM instructed RN to call MD on call as she is at another facility. Dr. Matthew Bass called on cell and updated on patient status. Pt states she has fast labors and does not take long when pushing. MD notified of this and states to recheck patient in 20-30 minutes and call back with an update. MD in office at this time and will wait for RN call. 8496- SVE 8-9/100/0. Pt placed high ledesma's. Dr. Matthew Bass called for update. Continue to monitor. 1050- 250ml bolus initiated, patient placed into exaggerated right lateral with leg in stirrup. Emily Chandra CNM on phone for update. Notified of variables and patient status with pressure. CNM on her way to unit at this time. 1145- C. Harmony Bottoms at bedside for evaluation. SVE 8-9/100/0 per CNM. AROM for small amount of clear fluid. Pt placed back into exaggerated left lateral with leg in stirrup. 1223- Deceleration noted. Pt placed right lateral with IV bolus initiated and O2 applied. 1228- CNM at bedside. SVE 9/100/0. FSE placed for closer monitoring. 1235- O2 removed. 1325- FHR low, attempted to place patient in hands and knees. O2 applied. CNM paged. Internal monitoring not reading, external applied and FHR noted. 1335- C. Harmony Bottoms at bedside. FSE replaced and IUPC inserted.  Pt SVE 9cm per CNM, head lower. Day catheter emptied. Patient resting right lateral.    1425- Pt placed trendelenburg. CNM notified of deceleration and low baseline. 1440- SVE by CNM unchanged. Pt placed high ledesma's. 1548- Pt turned left lateral with peanut ball. 1555- Called to patient's room with c/o increased, constant pressure. CNM notified. 1600- C. Lucinda Petit at bedside, SVE 10/100/+2. Preparing for delivery. 1604- Head seen at start of contraction. Pt pushing. Head delivered, nuchal x1 noted and reduced by CNM. Delivery time 1604.   1606- Cord clamped and cut by FOB  1610- Placenta delivered intact, discarded. Labial abrasions seen, no repair per CNM. EBL 150ml. Pt @-2.  5571- TRANSFER - OUT REPORT:    Verbal report given to ABEL Still RN(name) on Lyndsey Lozano  being transferred to Mother Baby(unit) for routine progression of care       Report consisted of patients Situation, Background, Assessment and   Recommendations(SBAR). Information from the following report(s) SBAR, Kardex, Intake/Output, MAR and Recent Results was reviewed with the receiving nurse. Lines:   Peripheral IV 12/31/19 Left Wrist (Active)   Site Assessment Clean, dry, & intact 12/31/2019  7:15 AM   Phlebitis Assessment 0 12/31/2019  7:15 AM   Infiltration Assessment 0 12/31/2019  7:15 AM   Dressing Status Clean, dry, & intact 12/31/2019  7:15 AM   Dressing Type Tape;Transparent 12/31/2019  7:15 AM   Hub Color/Line Status Pink; Infusing 12/31/2019  7:15 AM        Opportunity for questions and clarification was provided.       Patient transported with:   Registered Nurse

## 2020-01-01 VITALS
OXYGEN SATURATION: 99 % | TEMPERATURE: 97.5 F | DIASTOLIC BLOOD PRESSURE: 82 MMHG | HEART RATE: 83 BPM | SYSTOLIC BLOOD PRESSURE: 123 MMHG | RESPIRATION RATE: 17 BRPM

## 2020-01-01 PROBLEM — F17.200 CURRENT SMOKER: Status: ACTIVE | Noted: 2020-01-01

## 2020-01-01 PROBLEM — O34.219 PREVIOUS CESAREAN DELIVERY, ANTEPARTUM CONDITION OR COMPLICATION: Status: ACTIVE | Noted: 2020-01-01

## 2020-01-01 PROBLEM — D64.9 ANEMIA: Status: ACTIVE | Noted: 2020-01-01

## 2020-01-01 PROBLEM — Z33.1 IUP (INTRAUTERINE PREGNANCY), INCIDENTAL: Status: ACTIVE | Noted: 2020-01-01

## 2020-01-01 PROBLEM — Z33.1 IUP (INTRAUTERINE PREGNANCY), INCIDENTAL: Status: RESOLVED | Noted: 2020-01-01 | Resolved: 2020-01-01

## 2020-01-01 LAB
HCT VFR BLD AUTO: 32.5 % (ref 35–45)
HGB BLD-MCNC: 10.4 G/DL (ref 12–16)

## 2020-01-01 PROCEDURE — 85018 HEMOGLOBIN: CPT

## 2020-01-01 PROCEDURE — 36415 COLL VENOUS BLD VENIPUNCTURE: CPT

## 2020-01-01 PROCEDURE — 85014 HEMATOCRIT: CPT

## 2020-01-01 RX ORDER — LANOLIN ALCOHOL/MO/W.PET/CERES
325 CREAM (GRAM) TOPICAL
Qty: 30 TAB | Refills: 2 | Status: SHIPPED | OUTPATIENT
Start: 2020-01-01 | End: 2020-03-31

## 2020-01-01 RX ORDER — IBUPROFEN 200 MG
1 TABLET ORAL DAILY
Status: DISCONTINUED | OUTPATIENT
Start: 2020-01-01 | End: 2020-01-01 | Stop reason: HOSPADM

## 2020-01-01 RX ORDER — DOCUSATE SODIUM 100 MG/1
100 CAPSULE, LIQUID FILLED ORAL
Qty: 60 CAP | Refills: 1 | Status: SHIPPED | OUTPATIENT
Start: 2020-01-01 | End: 2020-03-31

## 2020-01-01 RX ORDER — IBUPROFEN 800 MG/1
800 TABLET ORAL
Qty: 30 TAB | Refills: 0 | Status: SHIPPED | OUTPATIENT
Start: 2020-01-01 | End: 2021-10-21

## 2020-01-01 NOTE — PROGRESS NOTES
0840: Patient in room with  and infant. Unhappy with care received last night. States that she was told by security that \"just like a [facility] she has rules to follow\" and that if she goes outside again the police will be called. Patient states she has gone home with her children before 24hrs with all 4 of her other children at [place] and does not understand why she is confined to the hospital wing. Nurse explained to the patient for safety reasons we have patients stay on the floor because if they leave the floor and fall or hemorrhage that care could be delayed and harm can happen. We are also a no smoking facility so we are unable to say she can \"go out to smoke\" because that would involve leaving the premises. Neonatologist needs an appointment made for tomorrow for a 24hr discharge and pediatricians offices are closed today to make an appointment. Will do babies discharge hearing screen, MST, and TCB at 24hrs and re-assess. Baby is also pending circumcision. Patient states she does not feel safe or welcome at the hospital after last night and feels she is treated badly \"just because I'm a smoker. \" This nurse apologized for the way things were     0900: CNM on for [employer] called to see patient, currently in another delivery, will come over when complete. 0930: Notified patient that nurse called Midwife and she will be over as soon as possible. 1015: CNM in to see patient    1100: Meghann bello) in to talk to patient about discharge plans    spent 30 mins doing education on mom and baby discharge. Discharge teaching to include \"free  lionel\" provided by hospital for patient to download. Baby care given including but not limited to:  screening of MST, TCB, and Hearing screen tests. Common infant rashes milia, cradle cap. Informed to call pediatrician if baby develops yellowing of the skin or eyes.  Discussed care or respiratory system with bulb syring, how to turn baby and burp to clear airway. Recommended infant CPR class. Keep umbilical cord dry, if becomes soiled okay to clean with alcohol pads, cord will fall off in 5-10 days. Circumcision care: Use petroleum jelly until the scab around penis comes off to prevent sticking to diaper. Do not rub or wash the area until completely healed. Call if you see any discolored discharge, bleeding, swelling, or foul odor. Bowel and bladder: keep records of infant voids and stools. May use diaper cream if needed, but never powder. SIDS and Shaken baby syndrome discussed. Bathe infant via sponge bath until cord is fallen off and healed. Safe sleeping, car seat education provided. Mother's postpartum care provided to include: Breastfeeding, refrain from pacifier or bottle use for 2-4 weeks until good breast feeding is established, if breast get full and warm, hand express or machine express. Keep feeding infant if engorgement or mastitis occurs or it may get worse. Breast feeding nutrient guide given. Discussed lochia bleeding, call  If there is an increase in bleeding or several clots being passed, any foul odors or abnormal discharge colors. Nothing in the vagina for at least 6 weeks until postpartum visit. Discussed cramping and  incisional/Vaginal pain. Discussed baby blues, emotional changes, and signs of postpartum depression. Seek medical help ASAP if thoughts of harming self or baby.

## 2020-01-01 NOTE — DISCHARGE SUMMARY
Obstetrical Discharge Summary     Name: Casey Schilling MRN: [id number]  SSN: [id number]    YOB: 1985  Age: 30 y.o. Sex: female      Admit Date: 2019    Discharge Date: 2020    Admitting Physician: Lindy Cruz MD     Attending Physician:  Patrick Waterman MD     Discharge Diagnoses:   Information for the patient's :  Manuel Lynn [[id number]]   Delivery of a 3.43 kg male infant via Vaginal, Spontaneous on 2019 at 4:04 PM  by Melvi Murcia. Apgars were 8  and 9 . Additional Diagnoses:   Problem List as of 2020 Date Reviewed: 2020          Codes Class Noted - Resolved    Previous  delivery, antepartum condition or complication XLI-04-MX: J67.295  ICD-9-CM: 654.23  2020 - Present        Anemia ICD-10-CM: D64.9  ICD-9-CM: 285.9  2020 - Present        Postpartum care following vaginal delivery ICD-10-CM: Z39.2  ICD-9-CM: V24.2  2020 - Present        Current smoker ICD-10-CM: F17.200  ICD-9-CM: 305.1  2020 - Present        Pregnancy ICD-10-CM: Z34.90  ICD-9-CM: V22.2  2019 - Present        RESOLVED: IUP (intrauterine pregnancy), incidental ICD-10-CM: Z34.90  ICD-9-CM: V22.2  2020 - 2020              Lab Results   Component Value Date/Time    Rubella, External Immune 2019     Recent Labs     20  0250   HGB 10.4*       Hospital Course: Postpartum course was complicated by anemia, which added 0 days to the patient's length of stay. Patient Instructions:   Current Discharge Medication List      START taking these medications    Details   ibuprofen (MOTRIN) 800 mg tablet Take 1 Tab by mouth every eight (8) hours as needed for Pain. Qty: 30 Tab, Refills: 0      ferrous sulfate (IRON) 325 mg (65 mg iron) tablet Take 1 Tab by mouth Daily (before breakfast) for 90 days.  Indications: anemia from inadequate iron  Qty: 30 Tab, Refills: 2      docusate sodium (COLACE) 100 mg capsule Take 1 Cap by mouth two (2) times daily as needed for Constipation for up to 90 days. Qty: 60 Cap, Refills: 1              Discharge Instructions: Discussed and provided with written discharge instructions- Postdelivery precautions and when to call. Patient activity to include: avoid heavy lifting, no more than the baby alone (not the baby in the car seat). Avoid intercourse until you are seen at your postpartum visit. No douching or tampon use. Check with obstetrician before starting or resuming an exercise program.     Follow-up Appointments   Procedures    FOLLOW UP VISIT Appointment in: 6 Weeks 1. Patient is to call Primary [facility] office to make 6 week postpartum follow-up visit. 1. Patient is to call [facility] office to make 6 week postpartum follow-up visit.      Standing Status:   Standing     Number of Occurrences:   1     Order Specific Question:   Appointment in     Answer:   6 Weeks        Signed By:  Suresh Masters CNM     January 1, 2020

## 2020-01-01 NOTE — ANESTHESIA POSTPROCEDURE EVALUATION
1/1/2020  5:14 PM    Laboring Epidural Follow-up Note     Referring physician: Yen Carrion MD   Patient status post vaginal delivery with labor epidural    Visit Vitals  /82 (BP 1 Location: Right arm, BP Patient Position: At rest)   Pulse 83   Temp 36.4 °C (97.5 °F)   Resp 17   SpO2 99%   Breastfeeding Unknown       Epidural removed by L&D staff  No sedation, pruritis noted. Adequate analgesia.   No obvious anesthesia complications          Colletta Rowels, CRNA

## 2020-01-01 NOTE — DISCHARGE INSTRUCTIONS
POST DELIVERY DISCHARGE INSTRUCTIONS    Name: Alan Cowart  YOB: 1985  Primary Diagnosis: Active Problems:    Anemia (1/1/2020)      Postpartum care following vaginal delivery (1/1/2020)      Current smoker (1/1/2020)        General:     Diet/Diet Restrictions:  Eight 8-ounce glasses of fluid daily (water, juices); avoid excessive caffeine intake. Meals/snacks as desired which are high in fiber and carbohydrates and low in fat and cholesterol. Physical Activity / Restrictions / Safety:     Avoid heavy lifting, no more than the baby alone (not the baby in the car seat). Avoid intercourse until you are seen at your postpartum visit. No douching or tampon use. Check with obstetrician before starting or resuming an exercise program.         Discharge Instructions/Special Treatment/Home Care Needs:     Continue prenatal vitamins. Continue to use squirt bottle with warm water on your perineum after each bathroom use until bleeding stops. Call your doctor for the following:     Fever over 101 degrees by mouth. Vaginal bleeding that soaks two pads per hour for more than one hour. Red streaks or increased swelling of legs, painful red streaks on your breast.  If you feel extremely anxious or overwhelmed. If you have thoughts of harming yourself and/or your baby. Pain Management:     Pain Management:   Take Acetaminophen (Tylenol) or Ibuprofen (Advil, Motrin), as directed for pain. Use a warm Sitz bath 3 times daily to relieve perineal or hemorrhoidal discomfort. For hemorrhoidal discomfort, use Tucks and Anusol cream as needed and directed. Follow-Up Care:     Appointment with MD:   Follow-up Appointments   Procedures    FOLLOW UP VISIT Appointment in: 6 Weeks 1. Patient is to call Primary [facility] office to make 6 week postpartum follow-up visit. 1. Patient is to call [facility] office to make 6 week postpartum follow-up visit.      Standing Status:   Standing     Number of Occurrences: 1     Order Specific Question:   Appointment in     Answer:   6 Weeks     Telephone number: [phone]      Signed By: Toshia Schwartz CNM

## 2020-01-01 NOTE — LACTATION NOTE
Per mom, infant latching and nursing well. Mom educated on breastfeeding basics--hunger cues, feeding on demand, waking baby if baby sleeps too long between feeds, importance of skin to skin, positioning and latching, risk of pacifier use and supplemental feedings, and importance of rooming in--and use of log sheet. Mom also educated on benefits of breastfeeding for herself and baby. Mom verbalized understanding. No questions at this time.  currently in nursery.

## 2020-01-01 NOTE — PROGRESS NOTES
Problem: Patient Education: Go to Patient Education Activity  Goal: Patient/Family Education  Outcome: Progressing Towards Goal     Problem: Vaginal Delivery: Day of Delivery-Post delivery  Goal: Activity/Safety  Outcome: Progressing Towards Goal  Goal: Consults, if ordered  Outcome: Progressing Towards Goal  Goal: Nutrition/Diet  Outcome: Progressing Towards Goal  Goal: Discharge Planning  Outcome: Progressing Towards Goal  Goal: Medications  Outcome: Progressing Towards Goal  Goal: Treatments/Interventions/Procedures  Outcome: Progressing Towards Goal  Goal: *Vital signs within defined limits  Outcome: Progressing Towards Goal  Goal: *Labs within defined limits  Outcome: Progressing Towards Goal  Goal: *Hemodynamically stable  Outcome: Progressing Towards Goal  Goal: *Optimal pain control at patient's stated goal  Outcome: Progressing Towards Goal  Goal: *Participates in infant care  Outcome: Progressing Towards Goal  Goal: *Demonstrates progressive activity  Outcome: Progressing Towards Goal  Goal: *Tolerating diet  Outcome: Progressing Towards Goal     Problem: Vaginal Delivery: Postpartum Day 1  Goal: Activity/Safety  Outcome: Progressing Towards Goal  Goal: Consults, if ordered  Outcome: Progressing Towards Goal  Goal: Diagnostic Test/Procedures  Outcome: Progressing Towards Goal  Goal: Nutrition/Diet  Outcome: Progressing Towards Goal  Goal: Discharge Planning  Outcome: Progressing Towards Goal  Goal: Medications  Outcome: Progressing Towards Goal  Goal: Treatments/Interventions/Procedures  Outcome: Progressing Towards Goal  Goal: Psychosocial  Outcome: Progressing Towards Goal  Goal: *Vital signs within defined limits  Outcome: Progressing Towards Goal  Goal: *Labs within defined limits  Outcome: Progressing Towards Goal  Goal: *Hemodynamically stable  Outcome: Progressing Towards Goal  Goal: *Optimal pain control at patient's stated goal  Outcome: Progressing Towards Goal  Goal: *Participates in infant care  Outcome: Progressing Towards Goal  Goal: *Demonstrates progressive activity  Outcome: Progressing Towards Goal  Goal: *Performs self perineal care  Outcome: Progressing Towards Goal  Goal: *Appropriate parent-infant bonding  Outcome: Progressing Towards Goal  Goal: *Tolerating diet  Outcome: Progressing Towards Goal  Goal: *Performs self breast care  Outcome: Progressing Towards Goal     Problem: Pain  Goal: *Control of Pain  Outcome: Progressing Towards Goal     Problem: Falls - Risk of  Goal: *Absence of Falls  Description  Document Jacky Fall Risk and appropriate interventions in the flowsheet.   Outcome: Progressing Towards Goal  Note: Fall Risk Interventions:            Medication Interventions: Teach patient to arise slowly                   Problem: Patient Education: Go to Patient Education Activity  Goal: Patient/Family Education  Outcome: Progressing Towards Goal     Problem: Lactation Care Plan  Goal: *Infant latching appropriately  Outcome: Progressing Towards Goal  Goal: *Weight loss less than 10% of birth weight  Outcome: Progressing Towards Goal     Problem: Patient Education: Go to Patient Education Activity  Goal: Patient/Family Education  Outcome: Progressing Towards Goal     KATYA REGAN

## 2020-01-01 NOTE — PROGRESS NOTES
Progress Note    Patient: Rustam Mulligan MRN: [id number]     YOB: 1985  Age: 30 y.o. Subjective:     Postpartum Day: 1    The patient is feeling well. Pain is  well controlled with current medications. Baby is feeding via breast without difficulty. Urinary output is adequate. Patient is tolerating a regular diet. Objective:      Patient Vitals for the past 8 hrs:   BP Temp Pulse Resp SpO2   20 0816 123/82 97.5 °F (36.4 °C) 83 17 99 %     General:    alert, cooperative, no distress   Lochia:  appropriate   Uterine Fundus:   firm @ umbilicus    Perineum:  Intact    DVT Evaluation:  No evidence of DVT seen on physical exam.  Negative Paulette's sign. No cords or calf tenderness. No significant calf/ankle edema. Lab/Data Review:  Recent Results (from the past 24 hour(s))   HEMOGLOBIN    Collection Time: 20  2:50 AM   Result Value Ref Range    HGB 10.4 (L) 12.0 - 16.0 g/dL   HEMATOCRIT    Collection Time: 20  2:50 AM   Result Value Ref Range    HCT 32.5 (L) 35.0 - 45.0 %     Lab results reviewed. For significant abnormal values and values requiring intervention, see assessment and plan. Assessment:     Delivery: ,     Plan:     Doing well postpartum vaginal delivery. Hgb 10.4 this am. Patient is asymptomatic for si/sx of anemia. Will continue on  ferrous sulfate qD. Patient requesting baby circumcision. The risks and benefits of the circumcision  procedure and anesthesia including: bleeding, infection, variability of cosmetic results were discussed at length with the mother. She is aware that future repeat procedures may be necessary. She gives informed consent to proceed as noted and her questions are answered. Will continue current postpartum care and education. Encouraged hydration, nutrition and ambulation. DC home tomorrow. Follow-up in office in 6 weeks. Call prn.       Current Discharge Medication List      START taking these medications    Details ibuprofen (MOTRIN) 800 mg tablet Take 1 Tab by mouth every eight (8) hours as needed for Pain. Qty: 30 Tab, Refills: 0      ferrous sulfate (IRON) 325 mg (65 mg iron) tablet Take 1 Tab by mouth Daily (before breakfast) for 90 days. Indications: anemia from inadequate iron  Qty: 30 Tab, Refills: 2      docusate sodium (COLACE) 100 mg capsule Take 1 Cap by mouth two (2) times daily as needed for Constipation for up to 90 days.   Qty: 60 Cap, Refills: 1             Signed By: Nathan Hussein CNM     January 1, 2020

## 2020-01-01 NOTE — PROGRESS NOTES
1920- Bedside and Verbal shift change report given to FAWAD Levy RN (oncoming nurse) by ABEL Zhang (offgoing nurse). Report included the following information SBAR, Kardex, Intake/Output, MAR and Recent Results. 2015- Pt. Joined at bedside by baby. Pt denies any drug known drug allergies. AAOx4. Vital signs stable. Pain 0/10, will continue to monitor. Educated on pain management and medications available, plan of care, signs and symptoms to report, and keeping gripper socks on while ambulating. Encouraged pt to increase ambulation time within the unit. Whiteboard updated. Breath sounds clear bilaterally. Fundus firm at U -2, scant rubra lochia. No clots noted. Pt denies any headache, visual disturbances, or epigastric pain. INT present, flushed, capped, and patent. Baby bands verified to match MOB. Pt states she would not like baby to have a bath, states she would like to do it when she is home. Pt educated on bulb syringe use, baby feeding frequency, recording I/O, SIDs risks and preventive measures, and safe sleep-verbalizes understanding. No further questions on concerns at this time. Assessment complete. Call bell within reach. Bed in lowest position. 2200- Pt resting comfortable supine in bed, awake and alert. No needs expressed at this time. Bed in lowest position. Call bell within reach. 2240- Pt sleeping with baby in arms, awoken to voice. Reeducated on safe sleep practices and importance of keeping baby supine in bassinet while sleeping- verbalizes understanding. Baby transported to nursery swaddled, supine in bassinet for assessment. Baby hospital bands and HUGs tag secure, present, and verified with MOB prior to leaving room. MOB verbalizes awareness of where the nursery is located. 2305- Baby transported supine in bassinet back to rooming in. Baby bands present, secure, and verified with MOB upon arrival. No needs expressed at this time. Bed in lowest position.  Call bell within reach.     0045- Notified by BUDDY Ansari RN and ЮЛИЯ Nelson CNA that pt would like to [place] outside and get fresh air\". Pt was educated on safety precautions and staying within the locked unit. 4049- Pt at nurses station stating she \"is going outside to get fresh air\". Educated pt on staying on unit. Encouraged pt to ambulate within unit, but importance of staying within doors of unit. Pt states \"this is ridiculous\" and that she \"has always been discharged before 24 hours after delivering\" and \"would like to talk to her doctor\". Educated pt on infant and pt safety and importance of baby not being left alone in room if she were to leave unit. Pt states \"her dad can come up here\". Will contact [facility] to discuss. [address], CNM notified, will return phone call. 0128- Pt states she talked to her \"friend who is a doctor\" who told her she can leave facility. Reeducated pt on this hospital policy. Pt states she will \"leave either way\". Informed pt that she may sign out AMA, educated pt on risks of doing so. Pt states \"Just call my doctor\". 0140- Notified by BUDDY Ansari RN that pt ambulated off unit. CNM notified. Security and nursing supervisor notified. 0153- PT ambulated back onto unit. Reeducated pt that she cannot leave unit. Pt verbalizes understanding. Pt ambulated back into rooming in.     0154- Notified security and nursing supervisor that pt has returned to unit. 0157- Security [employer] present in room to [employer] and safety accompanied by this nurse. INT removed. Pt reeducated on policy and procedures by this nurse and Sly Salas. Lighter and pack of cigarettes observed on bedside table. Educated pt on hospitals tobacco free facility. Sly Salas requested to confiscate tobacco. Pt stated that \"it is my property and you aren't taking my property\".  Sly Salas requested that FOB leave the property with the tobacco. Pt reeducated on her rights as a pt and can leave AMA if desired. Pt states \"that makes no sense\". Pt states \"we will just leave then\". Reeducated pt that baby cannot leave without pediatrician approval. Marcelina Goodman requested that FOB leave the property with the tobacco.     0206- FOB escorted by Marcelina Goodman off of unit without difficulty. [address], ANN updated. Orders received for nicotine patch. 0215- FOB ambulated back onto unit. 1997- K. Teodoro Cade, ABDON notified. 2076- Pt notified of nicotine patch if she desires. Educated pt on benefits of nicotine patch. Pt states \"im not trying to smoke. You just don't understand. \" Encouraged pt to notify nursing if she decides she would like nicotine patch. Pt verbalizes understanding. 0353- Pt resting comfortable supine in bed., awake and alert. No needs expressed at this time. Bed in lowest position. Call bell within reach. 8439- Pt resting comfortably in bed. Chest rise, fall and unlabored respirations observed. Bed in lowest position. Call bell within reach. 0700- Bedside and Verbal shift change report given to ABEL Thompson (oncoming nurse) by Dyan Quintero RN (offgoing nurse). Report included the following information SBAR, Kardex, Intake/Output, MAR and Recent Results. Opportunities for questions was provided.

## 2020-01-01 NOTE — PROGRESS NOTES
0139- Observed pt ambulating in hallway by nurses station. This nurse asked pt if she needed anything or if she is just walking, pt denied needs and stated \"just walking. \" Pt then walked away. This nurse then heard the door past the nurses station that leads to an adjacent unit click to unlock and then shut. This nurse then walked to door and did not see the pt at the door or past the door on adjacent unit. [id number] [address] [phone] RN notified.

## 2021-10-20 ENCOUNTER — ANESTHESIA (OUTPATIENT)
Dept: LABOR AND DELIVERY | Age: 36
DRG: 560 | End: 2021-10-20
Payer: MEDICAID

## 2021-10-20 ENCOUNTER — HOSPITAL ENCOUNTER (INPATIENT)
Age: 36
LOS: 1 days | Discharge: HOME | DRG: 560 | End: 2021-10-21
Attending: OBSTETRICS & GYNECOLOGY | Admitting: OBSTETRICS & GYNECOLOGY
Payer: MEDICAID

## 2021-10-20 ENCOUNTER — ANESTHESIA EVENT (OUTPATIENT)
Dept: LABOR AND DELIVERY | Age: 36
DRG: 560 | End: 2021-10-20
Payer: MEDICAID

## 2021-10-20 PROBLEM — O48.0 41 WEEKS GESTATION OF PREGNANCY: Status: ACTIVE | Noted: 2021-10-20

## 2021-10-20 PROBLEM — O09.43 HIGH RISK MULTIGRAVIDA IN THIRD TRIMESTER: Status: ACTIVE | Noted: 2021-10-20

## 2021-10-20 PROBLEM — O34.219 HISTORY OF CESAREAN SECTION COMPLICATING PREGNANCY: Status: ACTIVE | Noted: 2021-10-20

## 2021-10-20 PROBLEM — Z3A.41 41 WEEKS GESTATION OF PREGNANCY: Status: ACTIVE | Noted: 2021-10-20

## 2021-10-20 LAB
ABO + RH BLD: NORMAL
AMPHET UR QL SCN: NEGATIVE
BARBITURATES UR QL SCN: NEGATIVE
BASOPHILS # BLD: 0.1 K/UL (ref 0–0.1)
BASOPHILS NFR BLD: 1 % (ref 0–2)
BENZODIAZ UR QL: NEGATIVE
BLOOD GROUP ANTIBODIES SERPL: NORMAL
CANNABINOIDS UR QL SCN: POSITIVE
COCAINE UR QL SCN: NEGATIVE
DIFFERENTIAL METHOD BLD: ABNORMAL
EOSINOPHIL # BLD: 0.1 K/UL (ref 0–0.4)
EOSINOPHIL NFR BLD: 1 % (ref 0–5)
ERYTHROCYTE [DISTWIDTH] IN BLOOD BY AUTOMATED COUNT: 14.8 % (ref 11.6–14.5)
HCT VFR BLD AUTO: 35.4 % (ref 35–45)
HDSCOM,HDSCOM: ABNORMAL
HGB BLD-MCNC: 11.5 G/DL (ref 12–16)
LYMPHOCYTES # BLD: 3 K/UL (ref 0.9–3.6)
LYMPHOCYTES NFR BLD: 14 % (ref 21–52)
MCH RBC QN AUTO: 27.1 PG (ref 24–34)
MCHC RBC AUTO-ENTMCNC: 32.5 G/DL (ref 31–37)
MCV RBC AUTO: 83.3 FL (ref 78–100)
METHADONE UR QL: NEGATIVE
MONOCYTES # BLD: 1 K/UL (ref 0.05–1.2)
MONOCYTES NFR BLD: 5 % (ref 3–10)
NEUTS SEG # BLD: 16.5 K/UL (ref 1.8–8)
NEUTS SEG NFR BLD: 78 % (ref 40–73)
OPIATES UR QL: NEGATIVE
PCP UR QL: NEGATIVE
PLATELET # BLD AUTO: 367 K/UL (ref 135–420)
PMV BLD AUTO: 10.9 FL (ref 9.2–11.8)
RBC # BLD AUTO: 4.25 M/UL (ref 4.2–5.3)
SPECIMEN EXP DATE BLD: NORMAL
WBC # BLD AUTO: 21.2 K/UL (ref 4.6–13.2)

## 2021-10-20 PROCEDURE — 80307 DRUG TEST PRSMV CHEM ANLYZR: CPT

## 2021-10-20 PROCEDURE — 74011250636 HC RX REV CODE- 250/636: Performed by: ADVANCED PRACTICE MIDWIFE

## 2021-10-20 PROCEDURE — 75410000002 HC LABOR FEE PER 1 HR

## 2021-10-20 PROCEDURE — 74011000258 HC RX REV CODE- 258

## 2021-10-20 PROCEDURE — 75410000003 HC RECOV DEL/VAG/CSECN EA 0.5 HR

## 2021-10-20 PROCEDURE — 77030007879 HC KT SPN EPDRL TELE -B: Performed by: NURSE ANESTHETIST, CERTIFIED REGISTERED

## 2021-10-20 PROCEDURE — 86901 BLOOD TYPING SEROLOGIC RH(D): CPT

## 2021-10-20 PROCEDURE — 77030019905 HC CATH URETH INTMIT MDII -A

## 2021-10-20 PROCEDURE — 65270000029 HC RM PRIVATE

## 2021-10-20 PROCEDURE — 76060000078 HC EPIDURAL ANESTHESIA

## 2021-10-20 PROCEDURE — 75410000000 HC DELIVERY VAGINAL/SINGLE

## 2021-10-20 PROCEDURE — 74011250636 HC RX REV CODE- 250/636: Performed by: NURSE ANESTHETIST, CERTIFIED REGISTERED

## 2021-10-20 PROCEDURE — 4A1HXCZ MONITORING OF PRODUCTS OF CONCEPTION, CARDIAC RATE, EXTERNAL APPROACH: ICD-10-PCS | Performed by: OBSTETRICS & GYNECOLOGY

## 2021-10-20 PROCEDURE — 74011250636 HC RX REV CODE- 250/636

## 2021-10-20 PROCEDURE — 74011000258 HC RX REV CODE- 258: Performed by: ADVANCED PRACTICE MIDWIFE

## 2021-10-20 PROCEDURE — 85025 COMPLETE CBC W/AUTO DIFF WBC: CPT

## 2021-10-20 PROCEDURE — 00HU33Z INSERTION OF INFUSION DEVICE INTO SPINAL CANAL, PERCUTANEOUS APPROACH: ICD-10-PCS | Performed by: NURSE ANESTHETIST, CERTIFIED REGISTERED

## 2021-10-20 PROCEDURE — 74011000250 HC RX REV CODE- 250: Performed by: NURSE ANESTHETIST, CERTIFIED REGISTERED

## 2021-10-20 PROCEDURE — 74011250637 HC RX REV CODE- 250/637: Performed by: ADVANCED PRACTICE MIDWIFE

## 2021-10-20 RX ORDER — ONDANSETRON 2 MG/ML
4 INJECTION INTRAMUSCULAR; INTRAVENOUS
Status: DISCONTINUED | OUTPATIENT
Start: 2021-10-20 | End: 2021-10-20 | Stop reason: HOSPADM

## 2021-10-20 RX ORDER — SODIUM CHLORIDE, SODIUM LACTATE, POTASSIUM CHLORIDE, CALCIUM CHLORIDE 600; 310; 30; 20 MG/100ML; MG/100ML; MG/100ML; MG/100ML
125 INJECTION, SOLUTION INTRAVENOUS CONTINUOUS
Status: DISCONTINUED | OUTPATIENT
Start: 2021-10-20 | End: 2021-10-20 | Stop reason: HOSPADM

## 2021-10-20 RX ORDER — IBUPROFEN 400 MG/1
800 TABLET ORAL
Status: DISCONTINUED | OUTPATIENT
Start: 2021-10-20 | End: 2021-10-21 | Stop reason: HOSPADM

## 2021-10-20 RX ORDER — MINERAL OIL
30 OIL (ML) ORAL AS NEEDED
Status: DISCONTINUED | OUTPATIENT
Start: 2021-10-20 | End: 2021-10-20 | Stop reason: HOSPADM

## 2021-10-20 RX ORDER — NALOXONE HYDROCHLORIDE 0.4 MG/ML
0.2 INJECTION, SOLUTION INTRAMUSCULAR; INTRAVENOUS; SUBCUTANEOUS AS NEEDED
Status: DISCONTINUED | OUTPATIENT
Start: 2021-10-20 | End: 2021-10-20 | Stop reason: HOSPADM

## 2021-10-20 RX ORDER — SODIUM CHLORIDE 0.9 % (FLUSH) 0.9 %
5-40 SYRINGE (ML) INJECTION AS NEEDED
Status: DISCONTINUED | OUTPATIENT
Start: 2021-10-20 | End: 2021-10-20 | Stop reason: HOSPADM

## 2021-10-20 RX ORDER — EPHEDRINE SULFATE/0.9% NACL/PF 50 MG/5 ML
10 SYRINGE (ML) INTRAVENOUS AS NEEDED
Status: DISCONTINUED | OUTPATIENT
Start: 2021-10-20 | End: 2021-10-20 | Stop reason: HOSPADM

## 2021-10-20 RX ORDER — BUTORPHANOL TARTRATE 2 MG/ML
INJECTION INTRAMUSCULAR; INTRAVENOUS
Status: DISPENSED
Start: 2021-10-20 | End: 2021-10-20

## 2021-10-20 RX ORDER — BUTORPHANOL TARTRATE 2 MG/ML
2 INJECTION INTRAMUSCULAR; INTRAVENOUS
Status: DISCONTINUED | OUTPATIENT
Start: 2021-10-20 | End: 2021-10-20 | Stop reason: HOSPADM

## 2021-10-20 RX ORDER — LIDOCAINE HYDROCHLORIDE AND EPINEPHRINE 15; 5 MG/ML; UG/ML
INJECTION, SOLUTION EPIDURAL AS NEEDED
Status: DISCONTINUED | OUTPATIENT
Start: 2021-10-20 | End: 2021-10-20 | Stop reason: HOSPADM

## 2021-10-20 RX ORDER — FENTANYL/ROPIVACAINE/NS/PF 2MCG/ML-.1
1-15 PLASTIC BAG, INJECTION (ML) EPIDURAL
Status: DISCONTINUED | OUTPATIENT
Start: 2021-10-20 | End: 2021-10-20 | Stop reason: HOSPADM

## 2021-10-20 RX ORDER — SODIUM CHLORIDE 900 MG/100ML
INJECTION INTRAVENOUS
Status: DISPENSED
Start: 2021-10-20 | End: 2021-10-20

## 2021-10-20 RX ORDER — PROMETHAZINE HYDROCHLORIDE 25 MG/ML
25 INJECTION, SOLUTION INTRAMUSCULAR; INTRAVENOUS
Status: DISCONTINUED | OUTPATIENT
Start: 2021-10-20 | End: 2021-10-21 | Stop reason: HOSPADM

## 2021-10-20 RX ORDER — OXYCODONE AND ACETAMINOPHEN 5; 325 MG/1; MG/1
2 TABLET ORAL
Status: DISCONTINUED | OUTPATIENT
Start: 2021-10-20 | End: 2021-10-21 | Stop reason: HOSPADM

## 2021-10-20 RX ORDER — OXYTOCIN/0.9 % SODIUM CHLORIDE 30/500 ML
87.3 PLASTIC BAG, INJECTION (ML) INTRAVENOUS AS NEEDED
Status: COMPLETED | OUTPATIENT
Start: 2021-10-20 | End: 2021-10-20

## 2021-10-20 RX ORDER — SODIUM CHLORIDE 0.9 % (FLUSH) 0.9 %
5-40 SYRINGE (ML) INJECTION EVERY 8 HOURS
Status: DISCONTINUED | OUTPATIENT
Start: 2021-10-20 | End: 2021-10-20 | Stop reason: HOSPADM

## 2021-10-20 RX ORDER — PHENYLEPHRINE HCL IN 0.9% NACL 1 MG/10 ML
80 SYRINGE (ML) INTRAVENOUS AS NEEDED
Status: DISCONTINUED | OUTPATIENT
Start: 2021-10-20 | End: 2021-10-20 | Stop reason: HOSPADM

## 2021-10-20 RX ORDER — NALBUPHINE HYDROCHLORIDE 10 MG/ML
10 INJECTION, SOLUTION INTRAMUSCULAR; INTRAVENOUS; SUBCUTANEOUS
Status: DISCONTINUED | OUTPATIENT
Start: 2021-10-20 | End: 2021-10-20 | Stop reason: HOSPADM

## 2021-10-20 RX ORDER — CALCIUM CARB/MAGNESIUM CARB 311-232MG
5 TABLET ORAL
Status: DISCONTINUED | OUTPATIENT
Start: 2021-10-20 | End: 2021-10-21 | Stop reason: HOSPADM

## 2021-10-20 RX ORDER — BUPIVACAINE HYDROCHLORIDE 2.5 MG/ML
INJECTION, SOLUTION EPIDURAL; INFILTRATION; INTRACAUDAL AS NEEDED
Status: DISCONTINUED | OUTPATIENT
Start: 2021-10-20 | End: 2021-10-20 | Stop reason: HOSPADM

## 2021-10-20 RX ORDER — FENTANYL/ROPIVACAINE/NS/PF 2MCG/ML-.1
PLASTIC BAG, INJECTION (ML) EPIDURAL
Status: COMPLETED
Start: 2021-10-20 | End: 2021-10-20

## 2021-10-20 RX ORDER — LIDOCAINE HYDROCHLORIDE 10 MG/ML
20 INJECTION, SOLUTION EPIDURAL; INFILTRATION; INTRACAUDAL; PERINEURAL AS NEEDED
Status: DISCONTINUED | OUTPATIENT
Start: 2021-10-20 | End: 2021-10-20 | Stop reason: HOSPADM

## 2021-10-20 RX ORDER — TERBUTALINE SULFATE 1 MG/ML
0.25 INJECTION SUBCUTANEOUS
Status: DISCONTINUED | OUTPATIENT
Start: 2021-10-20 | End: 2021-10-20 | Stop reason: HOSPADM

## 2021-10-20 RX ORDER — PENICILLIN G POTASSIUM 5000000 [IU]/1
INJECTION, POWDER, FOR SOLUTION INTRAMUSCULAR; INTRAVENOUS
Status: DISPENSED
Start: 2021-10-20 | End: 2021-10-20

## 2021-10-20 RX ORDER — HYDROMORPHONE HYDROCHLORIDE 1 MG/ML
1 INJECTION, SOLUTION INTRAMUSCULAR; INTRAVENOUS; SUBCUTANEOUS
Status: DISCONTINUED | OUTPATIENT
Start: 2021-10-20 | End: 2021-10-20 | Stop reason: HOSPADM

## 2021-10-20 RX ORDER — ONDANSETRON 2 MG/ML
INJECTION INTRAMUSCULAR; INTRAVENOUS
Status: COMPLETED
Start: 2021-10-20 | End: 2021-10-20

## 2021-10-20 RX ORDER — OXYTOCIN/RINGER'S LACTATE 30/500 ML
10 PLASTIC BAG, INJECTION (ML) INTRAVENOUS AS NEEDED
Status: DISCONTINUED | OUTPATIENT
Start: 2021-10-20 | End: 2021-10-20 | Stop reason: HOSPADM

## 2021-10-20 RX ORDER — AMOXICILLIN 250 MG
1 CAPSULE ORAL
Status: DISCONTINUED | OUTPATIENT
Start: 2021-10-20 | End: 2021-10-21 | Stop reason: HOSPADM

## 2021-10-20 RX ORDER — ACETAMINOPHEN 325 MG/1
650 TABLET ORAL
Status: DISCONTINUED | OUTPATIENT
Start: 2021-10-20 | End: 2021-10-21 | Stop reason: HOSPADM

## 2021-10-20 RX ORDER — METHYLERGONOVINE MALEATE 0.2 MG/ML
0.2 INJECTION INTRAVENOUS AS NEEDED
Status: DISCONTINUED | OUTPATIENT
Start: 2021-10-20 | End: 2021-10-20 | Stop reason: HOSPADM

## 2021-10-20 RX ORDER — LIDOCAINE HYDROCHLORIDE 10 MG/ML
INJECTION INFILTRATION; PERINEURAL AS NEEDED
Status: DISCONTINUED | OUTPATIENT
Start: 2021-10-20 | End: 2021-10-20 | Stop reason: HOSPADM

## 2021-10-20 RX ADMIN — BUTORPHANOL TARTRATE 2 MG: 2 INJECTION, SOLUTION INTRAMUSCULAR; INTRAVENOUS at 03:44

## 2021-10-20 RX ADMIN — LIDOCAINE HYDROCHLORIDE,EPINEPHRINE BITARTRATE 3 ML: 15; .005 INJECTION, SOLUTION EPIDURAL; INFILTRATION; INTRACAUDAL; PERINEURAL at 03:18

## 2021-10-20 RX ADMIN — ACETAMINOPHEN 650 MG: 325 TABLET ORAL at 21:32

## 2021-10-20 RX ADMIN — ONDANSETRON 4 MG: 2 INJECTION INTRAMUSCULAR; INTRAVENOUS at 03:10

## 2021-10-20 RX ADMIN — ROPIVACAINE HYDROCHLORIDE 12 ML/HR: 5 INJECTION, SOLUTION EPIDURAL; INFILTRATION; PERINEURAL at 03:22

## 2021-10-20 RX ADMIN — IBUPROFEN 800 MG: 400 TABLET ORAL at 09:20

## 2021-10-20 RX ADMIN — LIDOCAINE HYDROCHLORIDE 4 ML: 10 INJECTION, SOLUTION INFILTRATION; PERINEURAL at 03:18

## 2021-10-20 RX ADMIN — Medication 12 ML/HR: at 03:22

## 2021-10-20 RX ADMIN — SODIUM CHLORIDE 5 MILLION UNITS: 900 INJECTION INTRAVENOUS at 02:38

## 2021-10-20 RX ADMIN — BUPIVACAINE HYDROCHLORIDE 4 ML: 2.5 INJECTION, SOLUTION EPIDURAL; INFILTRATION; INTRACAUDAL at 03:22

## 2021-10-20 RX ADMIN — BUPIVACAINE HYDROCHLORIDE 4 ML: 2.5 INJECTION, SOLUTION EPIDURAL; INFILTRATION; INTRACAUDAL at 03:24

## 2021-10-20 RX ADMIN — SODIUM CHLORIDE, SODIUM LACTATE, POTASSIUM CHLORIDE, AND CALCIUM CHLORIDE 1000 ML: 600; 310; 30; 20 INJECTION, SOLUTION INTRAVENOUS at 02:00

## 2021-10-20 RX ADMIN — Medication 87.3 MILLI-UNITS/MIN: at 04:05

## 2021-10-20 NOTE — L&D DELIVERY NOTE
Delivery Note    Obstetrician:  Kim Bryan CNM    Assistant: none    Pre-Delivery Diagnosis: Term pregnancy, Spontaneous labor, Single fetus and Pregnancy complicated by: prior  section    Post-Delivery Diagnosis: Living  infant(s) and Male    Intrapartum Event: None    Procedure: Successful     Epidural: YES    Monitor:  Fetal Heart Tones - Internal and Uterine Contractions - External    Indications for instrumental delivery: none    Estimated Blood Loss: 150    Episiotomy: none    Laceration(s):  none    Laceration(s) repair: NO    Presentation: Cephalic    Fetal Description: farah    Fetal Position: Occiput Anterior    Birth Weight: not yet assessed    Birth Length: not yet assessed    Apgar - One Minute: 8    Apgar - Five Minutes: 9    Umbilical Cord: Nuchal Cord x  1  Specimens: placenta delivered intact  Complications:  none           Cord Blood Results:   Information for the patient's :  Dorma Seat [[id number]]   No results found for: PCTABR, PCTDIG, BILI, ABORH     Prenatal Labs:     Lab Results   Component Value Date/Time    ABO/Rh(D) A POSITIVE 10/20/2021 02:20 AM    HBsAg, External Negative 2019 12:00 AM    HIV, External Negative 2019 12:00 AM    Rubella, External Immune 2019 12:00 AM    RPR, External Nonreactive 2019 12:00 AM    Gonorrhea, External Negative 2019 12:00 AM    Chlamydia, External Negative 2019 12:00 AM        Attending Attestation: I was present and scrubbed for the entire procedure

## 2021-10-20 NOTE — PROGRESS NOTES
Problem: Patient Education: Go to Patient Education Activity  Goal: Patient/Family Education  Outcome: Progressing Towards Goal     Problem: Vaginal Delivery: Day of Deliver-Laboring  Goal: Activity/Safety  Outcome: Progressing Towards Goal  Goal: Consults, if ordered  Outcome: Progressing Towards Goal  Goal: Diagnostic Test/Procedures  Outcome: Progressing Towards Goal  Goal: Nutrition/Diet  Outcome: Progressing Towards Goal  Goal: Discharge Planning  Outcome: Progressing Towards Goal  Goal: Medications  Outcome: Progressing Towards Goal  Goal: Respiratory  Outcome: Progressing Towards Goal  Goal: Treatments/Interventions/Procedures  Outcome: Progressing Towards Goal  Goal: *Vital signs within defined limits  Outcome: Progressing Towards Goal  Goal: *Labs within defined limits  Outcome: Progressing Towards Goal  Goal: *Hemodynamically stable  Outcome: Progressing Towards Goal  Goal: *Optimal pain control at patient's stated goal  Outcome: Progressing Towards Goal     Problem: Vaginal Delivery: Day of Delivery-Post delivery  Goal: Activity/Safety  Outcome: Progressing Towards Goal  Goal: Consults, if ordered  Outcome: Progressing Towards Goal  Goal: Nutrition/Diet  Outcome: Progressing Towards Goal  Goal: Medications  Outcome: Progressing Towards Goal  Goal: Treatments/Interventions/Procedures  Outcome: Progressing Towards Goal  Goal: *Vital signs within defined limits  Outcome: Progressing Towards Goal  Goal: *Labs within defined limits  Outcome: Progressing Towards Goal  Goal: *Hemodynamically stable  Outcome: Progressing Towards Goal  Goal: *Optimal pain control at patient's stated goal  Outcome: Progressing Towards Goal  Goal: *Participates in infant care  Outcome: Progressing Towards Goal  Goal: *Demonstrates progressive activity  Outcome: Progressing Towards Goal  Goal: *Tolerating diet  Outcome: Progressing Towards Goal

## 2021-10-20 NOTE — H&P
History & Physical    Name: Mary Blanca MRN: [id number]  SSN: [id number]    YOB: 1985  Age: 31 y.o. Sex: female        Subjective:     Estimated Date of Delivery: 10/13/21  OB History    Para Term  AB Living   7 5 5   1 5   SAB TAB Ectopic Molar Multiple Live Births     1     0 5      # Outcome Date GA Lbr Beck/2nd Weight Sex Delivery Anes PTL Lv   7 Current            6 Term 19 40w1d 12:45 / 00:04 3.43 kg M Vag-Spont EPI N PIETRO   5 TAB 2018           4 Term 2015        PIETRO   3 Term 2014        PIETRO   2 Term 2012        PIETRO   1 Term      CS-LTranv   PIETRO       Ms. Hui Brannon is admitted with pregnancy at 41w0d for active labor. Prenatal course was complicated by limited prenatal care, prior  section, tobacco use, and anxiety. Please see prenatal records for details. Past Medical History:   Diagnosis Date    Anemia 2020     Past Surgical History:   Procedure Laterality Date    HX  SECTION       Social History     Occupational History    Not on file   Tobacco Use    Smoking status: Current Every Day Smoker     Packs/day: 1.00    Smokeless tobacco: Never Used   Substance and Sexual Activity    Alcohol use: Not Currently    Drug use: Never    Sexual activity: Yes     Partners: Male     No family history on file. No Known Allergies  Prior to Admission medications    Medication Sig Start Date End Date Taking? Authorizing Provider   ibuprofen (MOTRIN) 800 mg tablet Take 1 Tab by mouth every eight (8) hours as needed for Pain. 20   Raymundo Dubon CNM        Review of Systems: A comprehensive review of systems was negative except for that written in the HPI. Objective:     Vitals: There were no vitals filed for this visit.      Physical Exam:  Cervical Exam: 6 cm dilated    Membranes:  Intact  Fetal Heart Rate: Baseline: 115 per minute  Variability: moderate  Accelerations: yes  Decelerations: none  Uterine contractions: regular, every 3-6 minutes    Prenatal Labs:   Lab Results   Component Value Date/Time    ABO/Rh(D) A POSITIVE 2019 03:30 AM    Rubella, External Immune 2019 12:00 AM    HBsAg, External Negative 2019 12:00 AM    HIV, External Negative 2019 12:00 AM    RPR, External Nonreactive 2019 12:00 AM    Gonorrhea, External Negative 2019 12:00 AM    Chlamydia, External Negative 2019 12:00 AM    ABO,Rh A+ 2019 12:00 AM         Assessment/Plan:     Active Problems:    History of  section complicating pregnancy ()      41 weeks gestation of pregnancy (10/20/2021)      High risk multigravida in third trimester (10/20/2021)         Plan: Admit for active labor. Group B Strep was not tested, will treat with PCN per [facility] protocol. UDS sent d/t limited prenatal care.

## 2021-10-20 NOTE — PROGRESS NOTES
The documentation for this period is being entered following the guidelines as defined in the [facility] policy by Randy Carrasco RN.

## 2021-10-20 NOTE — PROGRESS NOTES
Problem: Patient Education: Go to Patient Education Activity  Goal: Patient/Family Education  Outcome: Progressing Towards Goal     Problem: Vaginal Delivery: Day of Delivery-Post delivery  Goal: Activity/Safety  Outcome: Progressing Towards Goal  Goal: Nutrition/Diet  Outcome: Progressing Towards Goal  Goal: Medications  Outcome: Progressing Towards Goal  Goal: Treatments/Interventions/Procedures  Outcome: Progressing Towards Goal  Goal: *Vital signs within defined limits  Outcome: Progressing Towards Goal  Goal: *Labs within defined limits  Outcome: Progressing Towards Goal  Goal: *Hemodynamically stable  Outcome: Progressing Towards Goal  Goal: *Optimal pain control at patient's stated goal  Outcome: Progressing Towards Goal  Goal: *Participates in infant care  Outcome: Progressing Towards Goal  Goal: *Demonstrates progressive activity  Outcome: Progressing Towards Goal  Goal: *Tolerating diet  Outcome: Progressing Towards Goal     Problem: Pain  Goal: *Control of Pain  Outcome: Progressing Towards Goal     Problem: Patient Education: Go to Patient Education Activity  Goal: Patient/Family Education  Outcome: Progressing Towards Goal

## 2021-10-20 NOTE — PROGRESS NOTES
1910 Bedside report received from Yimi Lancaster RN. Pt stable. Needs addressed. Whiteboard updated. Bed in lowest position and call bell in reach. 2015 Pt preparing to feed infant. Will return to complete assessment. 2120 Pt. joined at bedside by support person and baby. AAOx4. Pain 2/10. Fundus firm at U -1, small rubra lochia. No clots noted. Educated on signs and symptoms to report and plan of care. No further questions or concerns at this time. Callbell within reach. Bed in lowest position. 2132 Pt administered pain medication and given heat pack for 2/10 pain. 2300 Pt walking off unit and per patient she is allowed to walk the hospital. Security notified by birth registrar. 2312 Pt returned to unit by security. 0056 Pt sleeping with call bell in reach. 12 Pt preparing to breast feed infant. No needs at this time. 0450 Pt sitting in bed with call bell in reach. 2735 Pt sleeping with call bell in reach. 0710 Bedside shift change report given to Tasha Mendoza RN (oncoming nurse) by Donita Yao RN (offgoing nurse). Report included the following information SBAR, Kardex, Procedure Summary, MAR and Recent Results.

## 2021-10-20 NOTE — PROGRESS NOTES
0248 Bedside and verbal report received by TIMOTHY Vang [facility]. Kalina Jackson RN, care assumed at this time. [phone] paged for epidural with prompt return call. She is on her way. [address] Adrian Wilson at bedside for epidural placement. Procedure explained to include risks and benefits. Verbalizes understanding. All questions answered. 0314 Sitting up on side of bed. Position reviewed. 9068 Time out    0320 Epidural placed    0321 Test dose    Assisted back to bed after epidural placement. Tolerated procedure well. Vital signs stable. Monitors adjusted. 18  Viable male infant delivered by RANDI Kelly CNM, infant placed on maternal abdomen. Cord clamped at 1 minute of life by RANDI Kelly CNM and cut by FOB. Infant placed on maternal chest. Infant dried, stimulated and bulb suctioned. 0404 Intact placenta delivered by RANID Kelly CNM. Placenta discarded. 0410 Theodora care completed, clean pads applied to perineum. 0615 Ambulated up to BR and voided a large amount. Assisted with pericare. Pads changed. Tolerated well w/o any dizziness or weakness. Told not to get out of bed by herself. Call bell within reach. 3943 TRANSFER - OUT REPORT:    Verbal report given to Shay Aguilar RN(name) on Marlena Guerrier  being transferred to Mother/Baby(unit) for routine progression of care       Report consisted of patients Situation, Background, Assessment and   Recommendations(SBAR). Information from the following report(s) SBAR, Kardex, Procedure Summary, Intake/Output, MAR, Accordion, Recent Results and Quality Measures was reviewed with the receiving nurse. Lines:   Peripheral IV 10/20/21 Posterior;Right Forearm (Active)        Opportunity for questions and clarification was provided. Patient transported in stable condition via ambulation/pt choice. Pt refused to wear a face mask in room and in hallway. Addressed issue with nurse manager.  Pt transported with:   Registered Nurse

## 2021-10-20 NOTE — ANESTHESIA PROCEDURE NOTES
Epidural Block    Patient location during procedure: OB  Start time: 10/20/2021 3:13 AM  End time: 10/20/2021 3:24 AM  Reason for block: labor epidural  Staffing  Performed: CRNA   Resident/CRNA: Yomi Jeong CRNA  Preanesthetic Checklist  Completed: patient identified, IV checked, site marked, risks and benefits discussed, surgical consent, monitors and equipment checked, pre-op evaluation and timeout performed  Block Placement  Patient position: sitting  Prep: ChloraPrep  Sterility prep: cap, drape, gloves, hand and mask  Sedation level: no sedation  Patient monitoring: continuous pulse oximetry, frequent blood pressure checks and heart rate  Approach: midline  Location: lumbar  Lumbar location: L3-L4  Epidural  Loss of resistance technique: saline  Guidance: ultrasound guided  Needle  Needle type: Tuohy   Needle gauge: 17 G  Needle length: 9 cm  Needle insertion depth: 4.5 cm  Catheter type: end hole  Catheter size: 19 G  Catheter at skin depth: 9.5 cm  Catheter securement method: clear occlusive dressing, liquid medical adhesive and surgical tape  Test dose: negative  Assessment  Block outcome: pain improved  Number of attempts: 1  Procedure assessment: patient tolerated procedure well with no immediate complications

## 2021-10-20 NOTE — PROGRESS NOTES
3127 TRANSFER - IN REPORT:    Verbal report received from MARIAJOSE Padilla RN(name) on Debbie Alston  being received from L&D (unit) for routine progression of care      Report consisted of patients Situation, Background, Assessment and   Recommendations(SBAR). Information from the following report(s) SBAR, Kardex, Procedure Summary, Intake/Output, MAR and Recent Results was reviewed with the receiving nurse. Opportunity for questions and clarification was provided. 0820 Shift assessment complete     0823 infant falls and safety form gone over and signed    0920 2 tab motrin given    1017 pain reassessed     1136 Patient has no needs or concerns at this time    1225 spoke with patient regarding UDS and MDS, pt did not understand the need for both and did not want \"glue sticking to his penis\" (referring to  bb)    [phone] with casper regarding situation, orders received to d/c UDS and stick with MDS, pt agrees    1420 spoke with pt regarding hospital rules about leaving the unit, pt took IV out herself    1450 spoke with midwife about patient, midwife not allowing pt to go outside or be discharged early. Midwife stated that patient can be given a nicotine patch if needed. 1455 This nurse brought up situation to unit nursing director    1510 Reassessment complete and vital signs obtained    21  spoke with pt regarding d/c status, will speak with doctor    [phone] Patient has no needs or concerns at this time    [phone] in room with Dr. America Gray going over plan of care    191 Bedside and Verbal shift change report given to Stephen Valle RN (oncoming nurse) by Racheal Feng. Linda Telles RN (offgoing nurse). Report included the following information SBAR, Kardex, Procedure Summary, Intake/Output, MAR and Recent Results.

## 2021-10-20 NOTE — ANESTHESIA PREPROCEDURE EVALUATION
Relevant Problems   RESPIRATORY SYSTEM   (+) Current smoker      HEMATOLOGY   (+) Anemia       Anesthetic History   No history of anesthetic complications            Review of Systems / Medical History  Patient summary reviewed, nursing notes reviewed and pertinent labs reviewed    Pulmonary          Smoker         Neuro/Psych   Within defined limits           Cardiovascular  Within defined limits                     GI/Hepatic/Renal  Within defined limits              Endo/Other        Anemia     Other Findings              Physical Exam    Airway  Mallampati: II  TM Distance: 4 - 6 cm  Neck ROM: normal range of motion   Mouth opening: Normal     Cardiovascular  Regular rate and rhythm,  S1 and S2 normal,  no murmur, click, rub, or gallop             Dental  No notable dental hx       Pulmonary  Breath sounds clear to auscultation               Abdominal  GI exam deferred       Other Findings            Anesthetic Plan    ASA: 2  Anesthesia type: epidural            Anesthetic plan and risks discussed with: Patient

## 2021-10-21 VITALS
RESPIRATION RATE: 18 BRPM | TEMPERATURE: 97.8 F | HEART RATE: 58 BPM | OXYGEN SATURATION: 98 % | WEIGHT: 140 LBS | HEIGHT: 70 IN | BODY MASS INDEX: 20.04 KG/M2 | DIASTOLIC BLOOD PRESSURE: 57 MMHG | SYSTOLIC BLOOD PRESSURE: 108 MMHG

## 2021-10-21 LAB
HCT VFR BLD AUTO: 30.5 % (ref 35–45)
HGB BLD-MCNC: 10 G/DL (ref 12–16)

## 2021-10-21 PROCEDURE — 74011250637 HC RX REV CODE- 250/637: Performed by: ADVANCED PRACTICE MIDWIFE

## 2021-10-21 PROCEDURE — 36415 COLL VENOUS BLD VENIPUNCTURE: CPT

## 2021-10-21 PROCEDURE — 85018 HEMOGLOBIN: CPT

## 2021-10-21 RX ORDER — IBUPROFEN 800 MG/1
800 TABLET ORAL
Qty: 60 TABLET | Refills: 1 | Status: SHIPPED | OUTPATIENT
Start: 2021-10-21

## 2021-10-21 RX ADMIN — ACETAMINOPHEN 650 MG: 325 TABLET ORAL at 08:42

## 2021-10-21 NOTE — PROGRESS NOTES
Problem: Vaginal Delivery: Day of Delivery-Post delivery  Goal: Activity/Safety  Outcome: Progressing Towards Goal  Goal: Medications  Outcome: Progressing Towards Goal  Goal: Treatments/Interventions/Procedures  Outcome: Progressing Towards Goal  Goal: *Vital signs within defined limits  Outcome: Progressing Towards Goal  Goal: *Labs within defined limits  Outcome: Progressing Towards Goal  Goal: *Hemodynamically stable  Outcome: Progressing Towards Goal  Goal: *Optimal pain control at patient's stated goal  Outcome: Progressing Towards Goal  Goal: *Demonstrates progressive activity  Outcome: Progressing Towards Goal     Problem: Pain  Goal: *Control of Pain  Outcome: Progressing Towards Goal

## 2021-10-21 NOTE — PROGRESS NOTES
Progress Note    Patient: Lissett Cardona MRN: [id number]  SSN: [id number]    YOB: 1985  Age: 31 y.o. Sex: female      Subjective:     Postpartum Day: 1 Vaginal Delivery    The patient is without complaints. The patient is ambulating well. The patient is tolerating a normal diet. The baby is well. She is bottle feeding. Objective:      Patient Vitals for the past 8 hrs:   BP Temp Pulse Resp SpO2   10/21/21 0745 (!) 108/57 97.8 °F (36.6 °C) (!) 58 18 98 %     LABS: Recent Results (from the past 24 hour(s))   HGB & HCT    Collection Time: 10/21/21  1:30 AM   Result Value Ref Range    HGB 10.0 (L) 12.0 - 16.0 g/dL    HCT 30.5 (L) 35.0 - 45.0 %       Lab Results   Component Value Date/Time    HGB 10.0 (L) 10/21/2021 01:30 AM     Lab Results   Component Value Date/Time    HCT 30.5 (L) 10/21/2021 01:30 AM      Lochia:  appropriate   Uterine Fundus:   firm, 0     Lab/Data Review: All lab results for the last 24 hours reviewed. Assessment:     Status post: Doing well postpartum vaginal delivery day 1. Plan:     Continue day 1 post-vaginal delivery orders. Postpartum care discussed including diet, ambulation, and actvitiy restrictions. Plan for discharge home today.     Signed By: Dolly Cmapbell CNM     October 21, 2021

## 2021-10-21 NOTE — ANESTHESIA POSTPROCEDURE EVALUATION
10/21/2021  2:54 PM    Laboring Epidural Follow-up Note     Referring physician: Vivian Patel,*   Patient status post vaginal delivery with labor epidural    Visit Vitals  BP (!) 108/57 (BP 1 Location: Left upper arm, BP Patient Position: At rest;Lying)   Pulse (!) 58   Temp 36.6 °C (97.8 °F)   Resp 18   Ht 5' 9.5\" (1.765 m)   Wt 63.5 kg (140 lb)   SpO2 98%   Breastfeeding Unknown   BMI 20.38 kg/m²       Epidural removed by L&D staff  No sedation, pruritis noted. Adequate analgesia.   No obvious anesthesia complications          Lorrie Cosme CRNA

## 2021-10-21 NOTE — DISCHARGE SUMMARY
Obstetrical Discharge Summary     Name: Farheen Shankar MRN: [id number]  SSN: [id number]    YOB: 1985  Age: 31 y.o. Sex: female      Admit Date: 10/20/2021    Discharge Date: 10/21/2021     Admitting Physician: Perlita Amor MD     Attending Physician:  Francisco Herbert MD     Admission Diagnoses: High risk multigravida in third trimester [O09.43]  History of  section complicating pregnancy [I67.828]  41 weeks gestation of pregnancy [Z3A.41]    Discharge Diagnoses:   Information for the patient's :  Nitesh Moran [[id number]]   Delivery of a 3.374 kg male infant via Vaginal, Spontaneous on 10/20/2021 at 3:58 AM  by [address] [place] [address]. Apgars were 8  and 9 . Additional Diagnoses:   Hospital Problems  Date Reviewed: 2020        Codes Class Noted POA    History of  section complicating pregnancy [id number]: O34.219  ICD-9-CM: 654.20  10/20/2021 Unknown        41 weeks gestation of pregnancy ICD-10-CM: Z3A.41  ICD-9-CM: 645.10  10/20/2021 Unknown        High risk multigravida in third trimester ICD-10-CM: O09.43  ICD-9-CM: V23.89  10/20/2021 Unknown             Lab Results   Component Value Date/Time    Rubella, External Immune 2019 12:00 AM       Immunization(s): There is no immunization history for the selected administration types on file for this patient. Hospital Course: Normal hospital course following the delivery. Patient Instructions:   Current Discharge Medication List      CONTINUE these medications which have CHANGED    Details   ibuprofen (MOTRIN) 800 mg tablet Take 1 Tablet by mouth every eight (8) hours as needed (Pain scale 4-6). Qty: 60 Tablet, Refills: 1             Reference my discharge instructions.     Follow-up Appointments   Procedures    FOLLOW UP VISIT Appointment in: 6 Weeks     Standing Status:   Standing     Number of Occurrences:   1     Order Specific Question:   Appointment in     Answer:   6 Weeks Signed By:  Jason Ashley CNM     October 21, 2021

## 2021-10-21 NOTE — DISCHARGE INSTRUCTIONS
Patient Education        After Your Delivery (the Postpartum Period): Care Instructions  Your Care Instructions     Congratulations on the birth of your baby. Like pregnancy, the  period can be a time of excitement, deidre, and exhaustion. You may look at your wondrous little baby and feel happy. You may also be overwhelmed by your new sleep hours and new responsibilities. At first, babies often sleep during the days and are awake at night. They do not have a pattern or routine. They may make sudden gasps, jerk themselves awake, or look like they have crossed eyes. These are all normal, and they may even make you smile. In these first weeks after delivery, try to take good care of yourself. It may take 4 to 6 weeks to feel like yourself again, and possibly longer if you had a  birth. You will likely feel very tired for several weeks. Your days will be full of ups and downs, but lots of deidre as well. Follow-up care is a key part of your treatment and safety. Be sure to make and go to all appointments, and call your doctor if you are having problems. It's also a good idea to know your test results and keep a list of the medicines you take. How can you care for yourself at home? Take care of your body after delivery  · Use pads instead of tampons for the bloody flow that may last as long as 2 weeks. · Ease cramps with ibuprofen (Advil, Motrin). · Ease soreness of hemorrhoids and the area between your vagina and rectum with ice compresses or witch hazel pads. · Ease constipation by drinking lots of fluid and eating high-fiber foods. Ask your doctor about over-the-counter stool softeners. · Cleanse yourself with a gentle squeeze of warm water from a bottle instead of wiping with toilet paper. · Take a sitz bath in warm water several times a day. · Wear a good nursing bra. Ease sore and swollen breasts with warm, wet washcloths.   · If you aren't breastfeeding, use ice rather than heat for breast soreness. · Your period may not start for several months if you are breastfeeding. You may bleed more, and longer at first, than you did before you got pregnant. · Wait until you are healed (about 4 to 6 weeks) before you have sex. Ask your doctor when it is okay for you to have sex. · Try not to travel with your baby for 5 or 6 weeks. If you take a long car trip, make frequent stops to walk around and stretch. Avoid exhaustion  · Rest every day. Try to nap when your baby naps. · Ask another adult to be with you for a few days after delivery. · Plan for  if you have other children. · Stay flexible so you can eat at odd hours and sleep when you need to. Both you and your baby are making new schedules. · Plan small trips to get out of the house. Change can make you feel less tired. · Ask for help with housework, cooking, and shopping. Remind yourself that your job is to care for your baby. Know about help for postpartum depression  · \"Baby blues\" are common for the first 1 to 2 weeks after birth. You may cry or feel sad or irritable for no reason. · Rest whenever you can. Being tired makes it harder to handle your emotions. · Go for walks with your baby. · Talk to your partner, friends, and family about your feelings. · If your symptoms last for more than a few weeks, or if you feel very depressed, ask your doctor for help. · Postpartum depression can be treated. Support groups and counseling can help. Sometimes medicine can also help. Stay healthy  · Eat healthy foods so you have more energy. · If you breastfeed, avoid drugs. If you quit smoking during pregnancy, try to stay smoke-free. If you choose to have a drink now and then, have only one drink, and limit the number of occasions that you have a drink. Wait to breastfeed at least 2 hours after you have a drink to reduce the amount of alcohol the baby may get in the milk.   · Start daily exercise after 4 to 6 weeks, but rest when you feel tired.  · Learn exercises to tone your belly. Do Kegel exercises to regain strength in your pelvic muscles. You can do these exercises while you stand or sit. ? Squeeze the same muscles you would use to stop your urine. Your belly and thighs should not move. ? Hold the squeeze for 3 seconds, and then relax for 3 seconds. ? Start with 3 seconds. Then add 1 second each week until you are able to squeeze for 10 seconds. ? Repeat the exercise 10 to 15 times for each session. Do three or more sessions each day. · Find a class for you and your baby that has an exercise time. · If you had a  birth, give yourself a bit more time before you exercise, and be careful. When should you call for help? Call 911  anytime you think you may need emergency care. For example, call if:    · You have thoughts of harming yourself, your baby, or another person.     · You passed out (lost consciousness).     · You have chest pain, are short of breath, or cough up blood.     · You have a seizure. Call your doctor now or seek immediate medical care if:    · You have severe vaginal bleeding. This means you are passing blood clots and soaking through a pad each hour for 2 or more hours.     · You are dizzy or lightheaded, or you feel like you may faint.     · You have a fever.     · You have new or more belly pain.     · You have signs of a blood clot in your leg (called a deep vein thrombosis), such as:  ? Pain in the calf, back of the knee, thigh, or groin. ? Redness and swelling in your leg or groin.     · You have signs of preeclampsia, such as:  ? Sudden swelling of your face, hands, or feet. ? New vision problems (such as dimness, blurring, or seeing spots). ? A severe headache.    Watch closely for changes in your health, and be sure to contact your doctor if:    · Your vaginal bleeding seems to be getting heavier.     · You have new or worse vaginal discharge.     · You feel sad, anxious, or hopeless for more than a few days.     · You do not get better as expected. Where can you learn more? Go to http://www.[employer]com/  Enter A461 in the search box to learn more about \"After Your Delivery (the Postpartum Period): Care Instructions. \"  Current as of: June 16, 2021               Content Version: 13.0  © 6340-9738 [employer]. Care instructions adapted under license by [employer] (which disclaims liability or warranty for this information). If you have questions about a medical condition or this instruction, always ask your healthcare professional. [address] any warranty or liability for your use of this information.

## 2021-10-21 NOTE — PROGRESS NOTES
0715 Bedside and Verbal shift change report given to [place] RN (oncoming nurse) by Sosa Ham RN (offgoing nurse). Report included the following information SBAR, Kardex, Intake/Output, MAR and Recent Results. 0315 Assessment completed, pt C/O 2/10 abd pain unrelieved by repositioning, PRN Tylenol administered, will follow up on effectiveness. 1014 Pain re-assessment, pt states pain has subsided. 1112 In room with supervisor educating pt on expectations of wearing mask when outside the room and hospital rules of leaving the unit while admitted. 1409 Total bilirubin returned at 7.8 @ 33 hour low intermediate risk, K. Geovanny Hernandez MD made aware. 1423 AVS and discharge teachings reviewed and e-signed, arm bands verified and matching. Pt discharged home with baby in car seat both in stable condition.

## 2022-03-19 PROBLEM — O34.219 HISTORY OF CESAREAN SECTION COMPLICATING PREGNANCY: Status: ACTIVE | Noted: 2021-10-20

## 2022-03-19 PROBLEM — O09.43 HIGH RISK MULTIGRAVIDA IN THIRD TRIMESTER: Status: ACTIVE | Noted: 2021-10-20

## 2022-03-19 PROBLEM — Z3A.41 41 WEEKS GESTATION OF PREGNANCY: Status: ACTIVE | Noted: 2021-10-20

## 2022-03-19 PROBLEM — Z34.90 PREGNANCY: Status: ACTIVE | Noted: 2019-11-22

## 2022-03-19 PROBLEM — F17.200 CURRENT SMOKER: Status: ACTIVE | Noted: 2020-01-01

## 2022-03-19 PROBLEM — O34.219 PREVIOUS CESAREAN DELIVERY, ANTEPARTUM CONDITION OR COMPLICATION: Status: ACTIVE | Noted: 2020-01-01

## 2022-03-19 PROBLEM — O48.0 41 WEEKS GESTATION OF PREGNANCY: Status: ACTIVE | Noted: 2021-10-20

## 2022-03-20 PROBLEM — D64.9 ANEMIA: Status: ACTIVE | Noted: 2020-01-01

## 2024-01-19 ENCOUNTER — ANESTHESIA EVENT (OUTPATIENT)
Facility: HOSPITAL | Age: 39
End: 2024-01-19

## 2024-01-19 ENCOUNTER — HOSPITAL ENCOUNTER (INPATIENT)
Facility: HOSPITAL | Age: 39
LOS: 2 days | Discharge: HOME | DRG: 560 | End: 2024-01-21
Attending: OBSTETRICS & GYNECOLOGY | Admitting: STUDENT IN AN ORGANIZED HEALTH CARE EDUCATION/TRAINING PROGRAM
Payer: MEDICAID

## 2024-01-19 ENCOUNTER — ANESTHESIA (OUTPATIENT)
Facility: HOSPITAL | Age: 39
End: 2024-01-19

## 2024-01-19 PROBLEM — Z33.1 IUP (INTRAUTERINE PREGNANCY), INCIDENTAL: Status: ACTIVE | Noted: 2024-01-19

## 2024-01-19 LAB
AMPHET UR QL SCN: NEGATIVE
APTT PPP: 29.9 SEC (ref 23–36.4)
BARBITURATES UR QL SCN: NEGATIVE
BASOPHILS # BLD: 0 K/UL (ref 0–0.1)
BASOPHILS NFR BLD: 0 % (ref 0–2)
BENZODIAZ UR QL: NEGATIVE
CANNABINOIDS UR QL SCN: POSITIVE
COCAINE UR QL SCN: NEGATIVE
DIFFERENTIAL METHOD BLD: ABNORMAL
EOSINOPHIL # BLD: 0.3 K/UL (ref 0–0.4)
EOSINOPHIL NFR BLD: 1 % (ref 0–5)
ERYTHROCYTE [DISTWIDTH] IN BLOOD BY AUTOMATED COUNT: 16.8 % (ref 11.6–14.5)
FIBRINOGEN PPP-MCNC: 569 MG/DL (ref 210–451)
HCT VFR BLD AUTO: 35.3 % (ref 35–45)
HGB BLD-MCNC: 11.5 G/DL (ref 12–16)
HISTORY CHECK: NORMAL
IMM GRANULOCYTES # BLD AUTO: 0 K/UL (ref 0–0.04)
IMM GRANULOCYTES NFR BLD AUTO: 0 % (ref 0–0.5)
INR PPP: 0.9 (ref 0.9–1.1)
LYMPHOCYTES # BLD: 3.4 K/UL (ref 0.9–3.6)
LYMPHOCYTES NFR BLD: 13 % (ref 21–52)
Lab: ABNORMAL
MCH RBC QN AUTO: 25.7 PG (ref 24–34)
MCHC RBC AUTO-ENTMCNC: 32.6 G/DL (ref 31–37)
MCV RBC AUTO: 79 FL (ref 78–100)
METHADONE UR QL: NEGATIVE
MONOCYTES # BLD: 2.3 K/UL (ref 0.05–1.2)
MONOCYTES NFR BLD: 9 % (ref 3–10)
NEUTS SEG # BLD: 19.8 K/UL (ref 1.8–8)
NEUTS SEG NFR BLD: 77 % (ref 40–73)
NRBC # BLD: 0.04 K/UL (ref 0–0.01)
NRBC BLD-RTO: 0.2 PER 100 WBC
OPIATES UR QL: NEGATIVE
PCP UR QL: NEGATIVE
PLATELET # BLD AUTO: 494 K/UL (ref 135–420)
PLATELET COMMENT: ABNORMAL
PMV BLD AUTO: 9.4 FL (ref 9.2–11.8)
PROTHROMBIN TIME: 12.4 SEC (ref 11.9–14.7)
RBC # BLD AUTO: 4.47 M/UL (ref 4.2–5.3)
RBC MORPH BLD: ABNORMAL
WBC # BLD AUTO: 25.8 K/UL (ref 4.6–13.2)

## 2024-01-19 PROCEDURE — 85610 PROTHROMBIN TIME: CPT

## 2024-01-19 PROCEDURE — 2500000003 HC RX 250 WO HCPCS: Performed by: REGISTERED NURSE

## 2024-01-19 PROCEDURE — 85025 COMPLETE CBC W/AUTO DIFF WBC: CPT

## 2024-01-19 PROCEDURE — 86923 COMPATIBILITY TEST ELECTRIC: CPT

## 2024-01-19 PROCEDURE — 86901 BLOOD TYPING SEROLOGIC RH(D): CPT

## 2024-01-19 PROCEDURE — 3700000025 EPIDURAL BLOCK: Performed by: SPECIALIST

## 2024-01-19 PROCEDURE — 80307 DRUG TEST PRSMV CHEM ANLYZR: CPT

## 2024-01-19 PROCEDURE — 1100000000 HC RM PRIVATE

## 2024-01-19 PROCEDURE — 2580000003 HC RX 258

## 2024-01-19 PROCEDURE — 86900 BLOOD TYPING SEROLOGIC ABO: CPT

## 2024-01-19 PROCEDURE — 10907ZC DRAINAGE OF AMNIOTIC FLUID, THERAPEUTIC FROM PRODUCTS OF CONCEPTION, VIA NATURAL OR ARTIFICIAL OPENING: ICD-10-PCS

## 2024-01-19 PROCEDURE — 7210000100 HC LABOR FEE PER 1 HR

## 2024-01-19 PROCEDURE — 86850 RBC ANTIBODY SCREEN: CPT

## 2024-01-19 PROCEDURE — 36415 COLL VENOUS BLD VENIPUNCTURE: CPT

## 2024-01-19 PROCEDURE — 85384 FIBRINOGEN ACTIVITY: CPT

## 2024-01-19 PROCEDURE — 85730 THROMBOPLASTIN TIME PARTIAL: CPT

## 2024-01-19 PROCEDURE — 6360000002 HC RX W HCPCS

## 2024-01-19 PROCEDURE — 3700000156 HC EPIDURAL ANESTHESIA

## 2024-01-19 RX ORDER — NALOXONE HYDROCHLORIDE 0.4 MG/ML
INJECTION, SOLUTION INTRAMUSCULAR; INTRAVENOUS; SUBCUTANEOUS PRN
Status: DISCONTINUED | OUTPATIENT
Start: 2024-01-19 | End: 2024-01-20

## 2024-01-19 RX ORDER — LIDOCAINE HCL/EPINEPHRINE/PF 2%-1:200K
VIAL (ML) INJECTION PRN
Status: DISCONTINUED | OUTPATIENT
Start: 2024-01-19 | End: 2024-01-20 | Stop reason: SDUPTHER

## 2024-01-19 RX ORDER — CARBOPROST TROMETHAMINE 250 UG/ML
250 INJECTION, SOLUTION INTRAMUSCULAR PRN
Status: DISCONTINUED | OUTPATIENT
Start: 2024-01-19 | End: 2024-01-20 | Stop reason: SDUPTHER

## 2024-01-19 RX ORDER — FENTANYL CITRATE 50 UG/ML
25 INJECTION, SOLUTION INTRAMUSCULAR; INTRAVENOUS
Status: DISCONTINUED | OUTPATIENT
Start: 2024-01-19 | End: 2024-01-20 | Stop reason: HOSPADM

## 2024-01-19 RX ORDER — ACETAMINOPHEN 500 MG
1000 TABLET ORAL EVERY 6 HOURS PRN
Status: DISCONTINUED | OUTPATIENT
Start: 2024-01-19 | End: 2024-01-21 | Stop reason: HOSPADM

## 2024-01-19 RX ORDER — SIMETHICONE 80 MG
80 TABLET,CHEWABLE ORAL 4 TIMES DAILY PRN
Status: DISCONTINUED | OUTPATIENT
Start: 2024-01-19 | End: 2024-01-20 | Stop reason: SDUPTHER

## 2024-01-19 RX ORDER — MISOPROSTOL 200 UG/1
800 TABLET ORAL PRN
Status: DISCONTINUED | OUTPATIENT
Start: 2024-01-19 | End: 2024-01-20 | Stop reason: HOSPADM

## 2024-01-19 RX ORDER — LIDOCAINE HYDROCHLORIDE 10 MG/ML
30 INJECTION, SOLUTION EPIDURAL; INFILTRATION; INTRACAUDAL; PERINEURAL PRN
Status: DISCONTINUED | OUTPATIENT
Start: 2024-01-19 | End: 2024-01-20

## 2024-01-19 RX ORDER — ONDANSETRON 4 MG/1
4 TABLET, ORALLY DISINTEGRATING ORAL EVERY 8 HOURS PRN
Status: DISCONTINUED | OUTPATIENT
Start: 2024-01-19 | End: 2024-01-21 | Stop reason: HOSPADM

## 2024-01-19 RX ORDER — METHYLERGONOVINE MALEATE 0.2 MG/ML
200 INJECTION INTRAVENOUS PRN
Status: DISCONTINUED | OUTPATIENT
Start: 2024-01-19 | End: 2024-01-20 | Stop reason: SDUPTHER

## 2024-01-19 RX ORDER — SODIUM CHLORIDE, SODIUM LACTATE, POTASSIUM CHLORIDE, AND CALCIUM CHLORIDE .6; .31; .03; .02 G/100ML; G/100ML; G/100ML; G/100ML
500 INJECTION, SOLUTION INTRAVENOUS PRN
Status: DISCONTINUED | OUTPATIENT
Start: 2024-01-19 | End: 2024-01-20 | Stop reason: HOSPADM

## 2024-01-19 RX ORDER — ONDANSETRON 2 MG/ML
4 INJECTION INTRAMUSCULAR; INTRAVENOUS EVERY 6 HOURS PRN
Status: DISCONTINUED | OUTPATIENT
Start: 2024-01-19 | End: 2024-01-20

## 2024-01-19 RX ORDER — ONDANSETRON 2 MG/ML
4 INJECTION INTRAMUSCULAR; INTRAVENOUS EVERY 6 HOURS PRN
Status: DISCONTINUED | OUTPATIENT
Start: 2024-01-19 | End: 2024-01-20 | Stop reason: SDUPTHER

## 2024-01-19 RX ORDER — SODIUM CHLORIDE 0.9 % (FLUSH) 0.9 %
5-40 SYRINGE (ML) INJECTION EVERY 12 HOURS SCHEDULED
Status: DISCONTINUED | OUTPATIENT
Start: 2024-01-19 | End: 2024-01-20 | Stop reason: SDUPTHER

## 2024-01-19 RX ORDER — SODIUM CHLORIDE 0.9 % (FLUSH) 0.9 %
5-40 SYRINGE (ML) INJECTION PRN
Status: DISCONTINUED | OUTPATIENT
Start: 2024-01-19 | End: 2024-01-20 | Stop reason: SDUPTHER

## 2024-01-19 RX ORDER — SODIUM CHLORIDE 9 MG/ML
INJECTION, SOLUTION INTRAVENOUS PRN
Status: DISCONTINUED | OUTPATIENT
Start: 2024-01-19 | End: 2024-01-20 | Stop reason: HOSPADM

## 2024-01-19 RX ORDER — SODIUM CHLORIDE, SODIUM LACTATE, POTASSIUM CHLORIDE, AND CALCIUM CHLORIDE .6; .31; .03; .02 G/100ML; G/100ML; G/100ML; G/100ML
1000 INJECTION, SOLUTION INTRAVENOUS PRN
Status: DISCONTINUED | OUTPATIENT
Start: 2024-01-19 | End: 2024-01-20 | Stop reason: HOSPADM

## 2024-01-19 RX ORDER — TRANEXAMIC ACID 10 MG/ML
1000 INJECTION, SOLUTION INTRAVENOUS
Status: DISCONTINUED | OUTPATIENT
Start: 2024-01-19 | End: 2024-01-20 | Stop reason: SDUPTHER

## 2024-01-19 RX ORDER — SODIUM CHLORIDE 9 MG/ML
INJECTION, SOLUTION INTRAVENOUS PRN
Status: DISCONTINUED | OUTPATIENT
Start: 2024-01-19 | End: 2024-01-20

## 2024-01-19 RX ORDER — FENTANYL CIT 0.2 MG/100ML-ROPIV 0.2%-NACL 0.9% EPIDURAL INJ 2/0.2 MCG/ML-%
10 SOLUTION INJECTION CONTINUOUS
Status: DISCONTINUED | OUTPATIENT
Start: 2024-01-19 | End: 2024-01-20

## 2024-01-19 RX ORDER — MAGNESIUM CARB/ALUMINUM HYDROX 105-160MG
30 TABLET,CHEWABLE ORAL DAILY PRN
Status: DISCONTINUED | OUTPATIENT
Start: 2024-01-19 | End: 2024-01-20

## 2024-01-19 RX ORDER — LIDOCAINE HYDROCHLORIDE 10 MG/ML
INJECTION, SOLUTION INFILTRATION; PERINEURAL PRN
Status: DISCONTINUED | OUTPATIENT
Start: 2024-01-19 | End: 2024-01-20 | Stop reason: SDUPTHER

## 2024-01-19 RX ORDER — SODIUM CHLORIDE, SODIUM LACTATE, POTASSIUM CHLORIDE, CALCIUM CHLORIDE 600; 310; 30; 20 MG/100ML; MG/100ML; MG/100ML; MG/100ML
INJECTION, SOLUTION INTRAVENOUS CONTINUOUS
Status: DISCONTINUED | OUTPATIENT
Start: 2024-01-19 | End: 2024-01-20 | Stop reason: SDUPTHER

## 2024-01-19 RX ORDER — DOCUSATE SODIUM 100 MG/1
100 CAPSULE, LIQUID FILLED ORAL 2 TIMES DAILY PRN
Status: DISCONTINUED | OUTPATIENT
Start: 2024-01-19 | End: 2024-01-20 | Stop reason: SDUPTHER

## 2024-01-19 RX ORDER — TERBUTALINE SULFATE 1 MG/ML
0.25 INJECTION, SOLUTION SUBCUTANEOUS
Status: DISCONTINUED | OUTPATIENT
Start: 2024-01-19 | End: 2024-01-20 | Stop reason: HOSPADM

## 2024-01-19 RX ADMIN — LIDOCAINE HYDROCHLORIDE,EPINEPHRINE BITARTRATE 3 ML: 20; .005 INJECTION, SOLUTION EPIDURAL; INFILTRATION; INTRACAUDAL; PERINEURAL at 23:04

## 2024-01-19 RX ADMIN — SODIUM CHLORIDE, POTASSIUM CHLORIDE, SODIUM LACTATE AND CALCIUM CHLORIDE 1000 ML: 600; 310; 30; 20 INJECTION, SOLUTION INTRAVENOUS at 21:40

## 2024-01-19 RX ADMIN — Medication 10 ML/HR: at 23:19

## 2024-01-19 RX ADMIN — LIDOCAINE HYDROCHLORIDE 5 ML: 10 INJECTION, SOLUTION INFILTRATION; PERINEURAL at 22:59

## 2024-01-19 RX ADMIN — SODIUM CHLORIDE 5 MILLION UNITS: 900 INJECTION INTRAVENOUS at 22:22

## 2024-01-19 RX ADMIN — SODIUM CHLORIDE, POTASSIUM CHLORIDE, SODIUM LACTATE AND CALCIUM CHLORIDE 500 ML: 600; 310; 30; 20 INJECTION, SOLUTION INTRAVENOUS at 23:30

## 2024-01-19 RX ADMIN — LIDOCAINE HYDROCHLORIDE,EPINEPHRINE BITARTRATE 4 ML: 20; .005 INJECTION, SOLUTION EPIDURAL; INFILTRATION; INTRACAUDAL; PERINEURAL at 23:05

## 2024-01-19 ASSESSMENT — LIFESTYLE VARIABLES: SMOKING_STATUS: 1

## 2024-01-20 LAB
HCT VFR BLD AUTO: 29.9 % (ref 35–45)
HGB BLD-MCNC: 9.8 G/DL (ref 12–16)

## 2024-01-20 PROCEDURE — 2500000003 HC RX 250 WO HCPCS: Performed by: REGISTERED NURSE

## 2024-01-20 PROCEDURE — 88307 TISSUE EXAM BY PATHOLOGIST: CPT

## 2024-01-20 PROCEDURE — 2580000003 HC RX 258

## 2024-01-20 PROCEDURE — 1100000000 HC RM PRIVATE

## 2024-01-20 PROCEDURE — 3700000025 EPIDURAL BLOCK: Performed by: SPECIALIST

## 2024-01-20 PROCEDURE — 85014 HEMATOCRIT: CPT

## 2024-01-20 PROCEDURE — 85018 HEMOGLOBIN: CPT

## 2024-01-20 PROCEDURE — 00HU33Z INSERTION OF INFUSION DEVICE INTO SPINAL CANAL, PERCUTANEOUS APPROACH: ICD-10-PCS

## 2024-01-20 PROCEDURE — 6360000002 HC RX W HCPCS

## 2024-01-20 PROCEDURE — 6370000000 HC RX 637 (ALT 250 FOR IP)

## 2024-01-20 PROCEDURE — 7210000100 HC LABOR FEE PER 1 HR

## 2024-01-20 PROCEDURE — 7220000101 HC DELIVERY VAGINAL/SINGLE

## 2024-01-20 PROCEDURE — 36415 COLL VENOUS BLD VENIPUNCTURE: CPT

## 2024-01-20 RX ORDER — SODIUM CHLORIDE 0.9 % (FLUSH) 0.9 %
5-40 SYRINGE (ML) INJECTION EVERY 12 HOURS SCHEDULED
Status: DISCONTINUED | OUTPATIENT
Start: 2024-01-20 | End: 2024-01-20

## 2024-01-20 RX ORDER — METHYLERGONOVINE MALEATE 0.2 MG/ML
200 INJECTION INTRAVENOUS PRN
Status: DISCONTINUED | OUTPATIENT
Start: 2024-01-20 | End: 2024-01-21 | Stop reason: HOSPADM

## 2024-01-20 RX ORDER — ONDANSETRON 2 MG/ML
4 INJECTION INTRAMUSCULAR; INTRAVENOUS EVERY 6 HOURS PRN
Status: DISCONTINUED | OUTPATIENT
Start: 2024-01-20 | End: 2024-01-20

## 2024-01-20 RX ORDER — SENNA AND DOCUSATE SODIUM 50; 8.6 MG/1; MG/1
1 TABLET, FILM COATED ORAL DAILY PRN
Status: DISCONTINUED | OUTPATIENT
Start: 2024-01-20 | End: 2024-01-21 | Stop reason: HOSPADM

## 2024-01-20 RX ORDER — ONDANSETRON 2 MG/ML
4 INJECTION INTRAMUSCULAR; INTRAVENOUS EVERY 6 HOURS PRN
Status: DISCONTINUED | OUTPATIENT
Start: 2024-01-20 | End: 2024-01-20 | Stop reason: SDUPTHER

## 2024-01-20 RX ORDER — DOCUSATE SODIUM 100 MG/1
100 CAPSULE, LIQUID FILLED ORAL 2 TIMES DAILY PRN
Status: DISCONTINUED | OUTPATIENT
Start: 2024-01-20 | End: 2024-01-21 | Stop reason: HOSPADM

## 2024-01-20 RX ORDER — MISOPROSTOL 200 UG/1
800 TABLET ORAL PRN
Status: DISCONTINUED | OUTPATIENT
Start: 2024-01-20 | End: 2024-01-20 | Stop reason: SDUPTHER

## 2024-01-20 RX ORDER — IBUPROFEN 400 MG/1
800 TABLET ORAL EVERY 8 HOURS PRN
Status: DISCONTINUED | OUTPATIENT
Start: 2024-01-20 | End: 2024-01-21 | Stop reason: HOSPADM

## 2024-01-20 RX ORDER — LANOLIN/MINERAL OIL
LOTION (ML) TOPICAL PRN
Status: DISCONTINUED | OUTPATIENT
Start: 2024-01-20 | End: 2024-01-21 | Stop reason: HOSPADM

## 2024-01-20 RX ORDER — TRANEXAMIC ACID 10 MG/ML
1000 INJECTION, SOLUTION INTRAVENOUS
Status: DISPENSED | OUTPATIENT
Start: 2024-01-20 | End: 2024-01-21

## 2024-01-20 RX ORDER — MISOPROSTOL 200 UG/1
600 TABLET ORAL PRN
Status: DISCONTINUED | OUTPATIENT
Start: 2024-01-20 | End: 2024-01-21 | Stop reason: HOSPADM

## 2024-01-20 RX ORDER — ACETAMINOPHEN 500 MG
1000 TABLET ORAL EVERY 6 HOURS PRN
Status: DISCONTINUED | OUTPATIENT
Start: 2024-01-20 | End: 2024-01-20 | Stop reason: SDUPTHER

## 2024-01-20 RX ORDER — SIMETHICONE 80 MG
80 TABLET,CHEWABLE ORAL EVERY 6 HOURS PRN
Status: DISCONTINUED | OUTPATIENT
Start: 2024-01-20 | End: 2024-01-21 | Stop reason: HOSPADM

## 2024-01-20 RX ORDER — SODIUM CHLORIDE, SODIUM LACTATE, POTASSIUM CHLORIDE, CALCIUM CHLORIDE 600; 310; 30; 20 MG/100ML; MG/100ML; MG/100ML; MG/100ML
INJECTION, SOLUTION INTRAVENOUS CONTINUOUS
Status: DISCONTINUED | OUTPATIENT
Start: 2024-01-20 | End: 2024-01-20

## 2024-01-20 RX ORDER — SODIUM CHLORIDE 0.9 % (FLUSH) 0.9 %
5-40 SYRINGE (ML) INJECTION PRN
Status: DISCONTINUED | OUTPATIENT
Start: 2024-01-20 | End: 2024-01-21 | Stop reason: HOSPADM

## 2024-01-20 RX ORDER — CARBOPROST TROMETHAMINE 250 UG/ML
250 INJECTION, SOLUTION INTRAMUSCULAR PRN
Status: DISCONTINUED | OUTPATIENT
Start: 2024-01-20 | End: 2024-01-21 | Stop reason: HOSPADM

## 2024-01-20 RX ORDER — SODIUM CHLORIDE 9 MG/ML
INJECTION, SOLUTION INTRAVENOUS PRN
Status: DISCONTINUED | OUTPATIENT
Start: 2024-01-20 | End: 2024-01-21 | Stop reason: HOSPADM

## 2024-01-20 RX ADMIN — OXYTOCIN 87.3 MILLI-UNITS/MIN: 10 INJECTION INTRAVENOUS at 01:40

## 2024-01-20 RX ADMIN — ACETAMINOPHEN 1000 MG: 500 TABLET ORAL at 07:59

## 2024-01-20 RX ADMIN — IBUPROFEN 800 MG: 400 TABLET, FILM COATED ORAL at 12:11

## 2024-01-20 RX ADMIN — Medication 87.3 MILLI-UNITS/MIN: at 01:40

## 2024-01-20 RX ADMIN — SODIUM CHLORIDE, POTASSIUM CHLORIDE, SODIUM LACTATE AND CALCIUM CHLORIDE 500 ML: 600; 310; 30; 20 INJECTION, SOLUTION INTRAVENOUS at 01:18

## 2024-01-20 RX ADMIN — LIDOCAINE HYDROCHLORIDE,EPINEPHRINE BITARTRATE 6 ML: 20; .005 INJECTION, SOLUTION EPIDURAL; INFILTRATION; INTRACAUDAL; PERINEURAL at 00:01

## 2024-01-20 ASSESSMENT — PAIN SCALES - GENERAL
PAINLEVEL_OUTOF10: 1
PAINLEVEL_OUTOF10: 4

## 2024-01-20 NOTE — ANESTHESIA PROCEDURE NOTES
Epidural Block    Patient location during procedure: OB  Start time: 1/20/2024 12:47 AM  End time: 1/20/2024 12:54 AM  Reason for block: labor epidural  Staffing  Performed: resident/CRNA   Anesthesiologist: Hugh Sneed MD  Resident/CRNA: Ashely Sidhu APRN - CRNA  Performed by: Ashely Sidhu APRN - CRNA  Authorized by: Hugh Sneed MD    Epidural  Patient position: sitting  Prep: ChloraPrep and site prepped and draped  Patient monitoring: continuous pulse ox and frequent blood pressure checks  Approach: midline  Location: L3-4  Injection technique: FRANK saline  Provider prep: mask and sterile gloves  Needle  Needle type: Tuohy   Needle gauge: 17 G  Needle length: 3.5 in  Needle insertion depth: 5 cm  Catheter type: end hole  Catheter size: 19 G  Catheter at skin depth: 11 cm  Test dose: negativeCatheter Secured: tegaderm and tape  Assessment  Hemodynamics: stable  Attempts: 2  Outcomes: uncomplicated and patient tolerated procedure well  Additional Notes  Replaced existing epidural since patient reported no pain relief.     Preanesthetic Checklist  Completed: patient identified, IV checked, site marked, risks and benefits discussed, surgical/procedural consents, equipment checked, pre-op evaluation, timeout performed, anesthesia consent given, oxygen available, monitors applied/VS acknowledged, fire risk safety assessment completed and verbalized and blood product R/B/A discussed and consented

## 2024-01-20 NOTE — ANESTHESIA POSTPROCEDURE EVALUATION
1/20/2024  9:31 AM    Laboring Epidural Follow-up Note       Referring physician: Bello Newton MD   Patient status post vaginal delivery with labor epidural.      /63   Pulse 84   Temp 36.7 °C (98 °F) (Oral)   Resp 18   Ht 1.778 m (5' 10\")   Wt 68.5 kg (151 lb)   SpO2 97%   Breastfeeding Unknown   BMI 21.67 kg/m²       Claritza Cardona, APRN - CRNA

## 2024-01-20 NOTE — ANESTHESIA PROCEDURE NOTES
Epidural Block    Patient location during procedure: OB  Start time: 1/19/2024 10:59 PM  End time: 1/19/2024 11:04 PM  Reason for block: labor epidural  Staffing  Performed: resident/CRNA   Anesthesiologist: Hugh Sneed MD  Resident/CRNA: Ashely Sidhu APRN - CRNA  Performed by: Ashely Sidhu APRN - CRNA  Authorized by: Hugh Sneed MD    Epidural  Patient position: sitting  Prep: ChloraPrep  Patient monitoring: frequent blood pressure checks and continuous pulse ox  Approach: midline  Location: L3-4  Injection technique: FRANK saline  Guidance: ultrasound guided  Provider prep: mask and sterile gloves  Needle  Needle type: Tuohy   Needle gauge: 17 G  Needle length: 3.5 in  Needle insertion depth: 5 cm  Catheter type: end hole  Catheter size: 19 G  Catheter at skin depth: 11 cm  Test dose: negativeCatheter Secured: tegaderm and tape  Assessment  Hemodynamics: stable  Attempts: 1  Outcomes: uncomplicated and patient tolerated procedure well  Preanesthetic Checklist  Completed: patient identified, IV checked, site marked, risks and benefits discussed, surgical/procedural consents, equipment checked, pre-op evaluation, timeout performed, anesthesia consent given, oxygen available, monitors applied/VS acknowledged, fire risk safety assessment completed and verbalized and blood product R/B/A discussed and consented

## 2024-01-20 NOTE — H&P
History & Physical    Name: Soo Leger MRN: [id number]  SSN: [id number]    YOB: 1985  Age: 38 y.o.  Sex: female        Subjective:     Estimated Date of Delivery: 24  OB History    Para Term  AB Living   8 6 6   1 6   SAB IAB Ectopic Molar Multiple Live Births     1       6      # Outcome Date GA Lbr Buzz/2nd Weight Sex Delivery Anes PTL Lv   8 Current            7 Term 10/20/21 41w0d 00:23 / 00:03 3.374 kg (7 lb 7 oz) M Vag-Spont EPI N OLIVIER   6 Term 19 40w1d 12:45 / 00:04 3.43 kg (7 lb 9 oz) M Vag-Spont EPI N OLIVIER   5 IAB 2018           4 Term         OLIVIER   3 Term         OLIVIER   2 Term         OLIVIER   1 Term      CS-LTranv   OLIVIER       Soo Leger is 38 y.o., , at 41w0d presenting to L&D for  labor with VB . Upon arrival pt bleeding and passing clots; QBL 145g. Patient abdomen soft and +FHT. Vitals WNL. Patient states she came to [facility] for concerns of labor. SROM in car at 2100. VB started after arriving to hospital while coming upstairs to triage. Prenatal course has been complicated by a gap in care from 26-40wga, hx of c/s with 5 successful VBACs, AMA, anemia, marijuana and tobacco use, and GBS +. Please see prenatal records for details.     Past Medical History:   Diagnosis Date    Anemia 2020     Past Surgical History:   Procedure Laterality Date     SECTION       Social History     Occupational History    Not on file   Tobacco Use    Smoking status: Every Day     Current packs/day: 1.00     Types: Cigarettes    Smokeless tobacco: Never   Substance and Sexual Activity    Alcohol use: Not Currently    Drug use: Yes     Types: Marijuana (Weed)    Sexual activity: Not on file     History reviewed. No pertinent family history.    No Known Allergies  Prior to Admission medications    Medication Sig Start Date End Date Taking? Authorizing Provider   Prenatal Multivit-Min-Fe-FA (PRENATAL 1 + IRON PO)  23  Yes Provider,

## 2024-01-20 NOTE — L&D DELIVERY NOTE
Jon Leger [[id number]]      Labor Events     Labor: No   Steroids: None  Cervical Ripening Date/Time:      Antibiotics Received during Labor: Yes  Rupture Date/Time:  24 21:00:00   Rupture Type: SROM, Bulging  Fluid Color: Bright Red (Bloody), Meconium, Purulent  Meconium Consistency: Moderate  Fluid Odor: Foul  Fluid Volume: Moderate  Augmentation: AROM  Labor Complications: Other (Comment)              Anesthesia    Method: Epidural       Labor Event Times      Labor onset date/time:  24 21:24:00     Dilation complete date/time:  24      Start pushing date/time:  2024 01:35:00   Decision date/time (emergent ):            Labor Length    3rd stage: 0h 01m       Delivery Details      Delivery Date: 24 Delivery Time: 01:37:00   Delivery Type: Vaginal, Spontaneous              Eucha Presentation    Presentation: Vertex  _: Occiput  _: Anterior       Shoulder Dystocia    Shoulder Dystocia Present?: No       Assisted Delivery Details    Forceps Attempted?: No  Vacuum Extractor Attempted?: No                           Cord    Complications: None  Delayed Cord Clamping?: Yes  Cord Clamped Date/Time: 2024 01:38:00  Cord Blood Disposition: Lab  Gases Sent?: No              Placenta    Date/Time: 2024 01:38:00  Removal: Spontaneous  Appearance: Intact  Disposition: Lab, Pathology       Lacerations    Episiotomy: None  Perineal Lacerations: None  Other Lacerations: no non-perineal laceration       Vaginal Counts    Initial Count Personnel: JUDI MAJOR CNM  Initial Count Verified By: SÁNCHEZ AGRAWAL RN  Intial Sponge Count: Correct Intial Needles Count: Correct Intial Instruments Count: Correct   Final Sponges Count: Correct Final Needles  Count: Correct Final Instruments Count: Correct   Final Count Personnel: JUDI MAJOR CNM  Final Count Verified By: SÁNCHEZ AGRAWAL RN  Accurate Final Count?: Yes       Blood Loss  Mother: Soo Leger #[id number]     Start

## 2024-01-20 NOTE — ANESTHESIA PRE PROCEDURE
\"PREGSERUM\", \"HCG\", \"HCGQUANT\"     ABGs: No results found for: \"PHART\", \"PO2ART\", \"TKH9GIA\", \"BQZ6WCD\", \"BEART\", \"W1MZOFBT\"     Type & Screen (If Applicable):  No results found for: \"LABABO\", \"LABRH\"    Drug/Infectious Status (If Applicable):  No results found for: \"HIV\", \"HEPCAB\"    COVID-19 Screening (If Applicable): No results found for: \"COVID19\"        Anesthesia Evaluation  Patient summary reviewed and Nursing notes reviewed  Airway: Mallampati: II  TM distance: >3 FB   Neck ROM: full  Mouth opening: > = 3 FB   Dental: normal exam         Pulmonary: breath sounds clear to auscultation  (+)           current smoker (pt reports she smokes marijuana 4x daily.  smoked this afternoon)          Patient smoked on day of surgery.                 Cardiovascular:  Exercise tolerance: good (>4 METS)          Rhythm: regular  Rate: normal                    Neuro/Psych:               GI/Hepatic/Renal: Neg GI/Hepatic/Renal ROS            Endo/Other: Negative Endo/Other ROS                    Abdominal:             Vascular: negative vascular ROS.         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  Denies use now.        Anesthesia Plan      epidural     ASA 2             Anesthetic plan and risks discussed with patient.        Attending anesthesiologist reviewed and agrees with Preprocedure content      Post-op pain plan if not by surgeon: continuous epidural        OLI Hernández - CRNA   1/19/2024

## 2024-01-21 VITALS
HEART RATE: 72 BPM | HEIGHT: 70 IN | TEMPERATURE: 97.8 F | DIASTOLIC BLOOD PRESSURE: 76 MMHG | WEIGHT: 151 LBS | OXYGEN SATURATION: 100 % | SYSTOLIC BLOOD PRESSURE: 122 MMHG | RESPIRATION RATE: 18 BRPM | BODY MASS INDEX: 21.62 KG/M2

## 2024-01-21 PROBLEM — Z34.90 PREGNANCY: Status: RESOLVED | Noted: 2019-11-22 | Resolved: 2024-01-21

## 2024-01-21 PROBLEM — O09.43 HIGH RISK MULTIGRAVIDA IN THIRD TRIMESTER: Status: RESOLVED | Noted: 2021-10-20 | Resolved: 2024-01-21

## 2024-01-21 PROBLEM — D50.8 IRON DEFICIENCY ANEMIA SECONDARY TO INADEQUATE DIETARY IRON INTAKE: Status: ACTIVE | Noted: 2024-01-21

## 2024-01-21 PROBLEM — Z3A.41 41 WEEKS GESTATION OF PREGNANCY: Status: RESOLVED | Noted: 2021-10-20 | Resolved: 2024-01-21

## 2024-01-21 PROBLEM — Z33.1 IUP (INTRAUTERINE PREGNANCY), INCIDENTAL: Status: RESOLVED | Noted: 2024-01-19 | Resolved: 2024-01-21

## 2024-01-21 PROBLEM — D50.8 IRON DEFICIENCY ANEMIA SECONDARY TO INADEQUATE DIETARY IRON INTAKE: Status: ACTIVE | Noted: 2020-01-01

## 2024-01-21 PROBLEM — O34.219 PREVIOUS CESAREAN DELIVERY, ANTEPARTUM CONDITION OR COMPLICATION: Status: RESOLVED | Noted: 2020-01-01 | Resolved: 2024-01-21

## 2024-01-21 PROBLEM — O48.0 41 WEEKS GESTATION OF PREGNANCY: Status: RESOLVED | Noted: 2021-10-20 | Resolved: 2024-01-21

## 2024-01-21 PROBLEM — O34.219 HISTORY OF CESAREAN SECTION COMPLICATING PREGNANCY: Status: RESOLVED | Noted: 2021-10-20 | Resolved: 2024-01-21

## 2024-01-21 LAB
ERYTHROCYTE [DISTWIDTH] IN BLOOD BY AUTOMATED COUNT: 16.9 % (ref 11.6–14.5)
HCT VFR BLD AUTO: 29.2 % (ref 35–45)
HGB BLD-MCNC: 9.3 G/DL (ref 12–16)
MCH RBC QN AUTO: 25.6 PG (ref 24–34)
MCHC RBC AUTO-ENTMCNC: 31.8 G/DL (ref 31–37)
MCV RBC AUTO: 80.4 FL (ref 78–100)
NRBC # BLD: 0 K/UL (ref 0–0.01)
NRBC BLD-RTO: 0 PER 100 WBC
PLATELET # BLD AUTO: 420 K/UL (ref 135–420)
PMV BLD AUTO: 9.4 FL (ref 9.2–11.8)
RBC # BLD AUTO: 3.63 M/UL (ref 4.2–5.3)
WBC # BLD AUTO: 30.1 K/UL (ref 4.6–13.2)

## 2024-01-21 PROCEDURE — 36415 COLL VENOUS BLD VENIPUNCTURE: CPT

## 2024-01-21 PROCEDURE — 85027 COMPLETE CBC AUTOMATED: CPT

## 2024-01-21 PROCEDURE — 6370000000 HC RX 637 (ALT 250 FOR IP)

## 2024-01-21 RX ORDER — IBUPROFEN 800 MG/1
800 TABLET ORAL EVERY 8 HOURS PRN
Qty: 120 TABLET | Refills: 3 | Status: SHIPPED | OUTPATIENT
Start: 2024-01-21

## 2024-01-21 RX ADMIN — ACETAMINOPHEN 1000 MG: 500 TABLET ORAL at 10:50

## 2024-01-21 RX ADMIN — IBUPROFEN 800 MG: 400 TABLET, FILM COATED ORAL at 09:55

## 2024-01-21 ASSESSMENT — PAIN - FUNCTIONAL ASSESSMENT
PAIN_FUNCTIONAL_ASSESSMENT: ACTIVITIES ARE NOT PREVENTED
PAIN_FUNCTIONAL_ASSESSMENT: ACTIVITIES ARE NOT PREVENTED

## 2024-01-21 ASSESSMENT — PAIN DESCRIPTION - LOCATION
LOCATION: ABDOMEN
LOCATION: ABDOMEN

## 2024-01-21 ASSESSMENT — PAIN SCALES - GENERAL
PAINLEVEL_OUTOF10: 10
PAINLEVEL_OUTOF10: 8

## 2024-01-21 ASSESSMENT — PAIN DESCRIPTION - ORIENTATION
ORIENTATION: LOWER
ORIENTATION: LOWER;RIGHT

## 2024-01-21 ASSESSMENT — PAIN DESCRIPTION - DESCRIPTORS
DESCRIPTORS: BURNING
DESCRIPTORS: BURNING

## 2024-01-21 NOTE — DISCHARGE INSTRUCTIONS
After Your Delivery (the Postpartum Period): Care Instructions  Overview     Congratulations on the birth of your baby. Like pregnancy, the  period can be a time of excitement, odilon, and exhaustion. You may look at your wondrous little baby and feel happy. You may also be overwhelmed by your new sleep hours and new responsibilities.  At first, babies often sleep during the days and are awake at night. They do not have a pattern or routine. They may make sudden gasps, jerk themselves awake, or look like they have crossed eyes. These are all normal, and they may even make you smile.  In these first weeks after delivery, try to take good care of yourself. It may take 4 to 6 weeks to feel like yourself again, and possibly longer if you had a  birth. You will likely feel very tired for several weeks. Your days will be full of ups and downs, but lots of odilon as well.  Follow-up care is a key part of your treatment and safety. Be sure to make and go to all appointments, and call your doctor if you are having problems. It's also a good idea to know your test results and keep a list of the medicines you take.  How can you care for yourself at home?  Take care of your body after delivery  Use pads instead of tampons for the bloody flow that may last as long as 2 weeks.  Ease cramps with ibuprofen (Advil, Motrin).  Ease soreness of hemorrhoids and the area between your vagina and rectum with ice compresses or witch hazel pads.  Ease constipation by drinking lots of fluid and eating high-fiber foods. Ask your doctor about over-the-counter stool softeners.  Cleanse yourself with a gentle squeeze of warm water from a bottle instead of wiping with toilet paper.  Take a sitz bath in warm water several times a day.  Wear a good nursing bra. Ease sore and swollen breasts with warm, wet washcloths.  If you aren't breastfeeding, use ice rather than heat for breast soreness.  Your period may not start for several

## 2024-01-21 NOTE — PROGRESS NOTES
0036: S. May Such, CRNA called back to beside r/t no pain relief. Replacing epidural.    0042: Time out    0047 Procedure start, lidocaine administered    0052: Catheter In    0053: Test Dose    0054: Bolus Dose    0100: Repositioned to semi fowlers with right hip bump. Epidural placement complete.              
0720- Bedside and Verbal shift change report given to SANDRINE Hobbs RNC (oncoming nurse) by FOREIGN Osorio RN (offgoing nurse). Report included the following information SBAR, Kardex, Intake/Output, MAR and Recent Results.     1510- Bedside and Verbal shift change report given to SUMMER Aparicio, RN (oncoming nurse) by MESFIN Munoz RNC (offgoing nurse). Report included the following information SBAR, Kardex, Intake/Output, MAR and Recent Results. Opportunities for questions was provided.     
2245-  S. May-Javier, CRNA at bedside explaining epidural procedure, side effects, risks, benefits, and positioning. Patient verbalizes understanding.  Time-out: 2249  Procedure start: 2259  Catheter in, needle out: 2301  Test dose: 2304  Loading dose: 2304  Patient connected to pump: 2319    2315-  Bedside verbal report given to SÁNCHEZ Aaron RN(oncoming nurse) by MARGARITA Shine RN (off going nurse)   Report included SBAR, Kardex, Recent results, and MAR.   Opportunity provided to ask questions and all answered at this time.    
Postpartum Progress Note    Patient: Soo Leger MRN: [id number]  SSN: [id number]    YOB: 1985  Age: 38 y.o.  Sex: female      Subjective:     Postpartum Day: 1     Delivery: vaginal delivery    Client states she is feeling well aside from some intense afterpains that started after she waited too long to void her bladder. Her pain has been well controlled with no meds until now. She is is agreeable to taking ibuprofen for the next few days to help alleviate any other afterpains.  The baby is doing well and is feeding via breast without difficulty. The patient is ambulating well, denies dizziness, nausea, SOB with standing, and is tolerating a normal diet. States she would like to dc home today.      Objective:      Patient Vitals for the past 8 hrs:   BP Temp Temp src Pulse Resp SpO2   01/21/24 0548 109/60 97.5 °F (36.4 °C) Oral 77 18 98 %     General:    Alert, cooperative, no distress   Lochia:  appropriate    Fundus:   Firm, midline and at umbilicus     Lab/Data Review:  Recent Results (from the past 24 hour(s))   CBC    Collection Time: 01/21/24  3:08 AM   Result Value Ref Range    WBC 30.1 (H) 4.6 - 13.2 K/uL    RBC 3.63 (L) 4.20 - 5.30 M/uL    Hemoglobin 9.3 (L) 12.0 - 16.0 g/dL    Hematocrit 29.2 (L) 35.0 - 45.0 %    MCV 80.4 78.0 - 100.0 FL    MCH 25.6 24.0 - 34.0 PG    MCHC 31.8 31.0 - 37.0 g/dL    RDW 16.9 (H) 11.6 - 14.5 %    Platelets 420 135 - 420 K/uL    MPV 9.4 9.2 - 11.8 FL    Nucleated RBCs 0.0 0  WBC    nRBC 0.00 0.00 - 0.01 K/uL        Assessment:     Doing well postpartum vaginal delivery     Plan:     Postpartum care discussed including diet, ambulation, and activity.  Discharge instructions and questions answered for vaginal delivery.    OLI Ibarra, JEREMÍAS  January 21, 2024    
Mercedes, KIRKP.    0139: Placenta delivered intact. Counts verified. Perineum intact. QBL 50mL, postpartum hemorrhage risk is high and discussed with provider.    0345: Patients legs are still dense so we taran steady to BR and voided a large amount. Assisted with pericare. Pads changed. Tolerated well w/o any dizziness or weakness. Patent able to stand but feel like she's not ready to walk yet, so we taran steady to wheelchair.    0355: Transferred to postpartum    0405: Bedside and Verbal transfer report given to FOREIGN Osorio RN (oncoming nurse) by SÁNCHEZ Aaron RN (offgoing nurse). Report included the following information Nurse Handoff Report, Index, Intake/Output, MAR, and Recent Results.

## 2024-01-21 NOTE — PLAN OF CARE
Problem: Postpartum  Goal: Experiences normal postpartum course  Description:  Postpartum OB-Pregnancy care plan goal which identifies if the mother is experiencing a normal postpartum course  2024 by Emiliana Aparicio RN  Outcome: Progressing  2024 by Palma Munoz RN  Outcome: Progressing  2024 043 by [place] Osorio RN  Outcome: Progressing  Goal: Appropriate maternal -  bonding  Description:  Postpartum OB-Pregnancy care plan goal which identifies if the mother and  are bonding appropriately  2024 by Emiliana Aparicio RN  Outcome: Progressing  2024 by Palma Munoz RN  Outcome: Progressing  2024 by Florida Osorio RN  Outcome: Progressing  Goal: Establishment of infant feeding pattern  Description:  Postpartum OB-Pregnancy care plan goal which identifies if the mother is establishing a feeding pattern with their   2024 by Emiliana Aparicio RN  Outcome: Progressing  2024 by Palma Munoz RN  Outcome: Progressing  2024 by [place] Osorio RN  Outcome: Progressing     Problem: Pain  Goal: Verbalizes/displays adequate comfort level or baseline comfort level  2024 by Emiliana Aparicio RN  Outcome: Progressing  Flowsheets (Taken 2024 1700)  Verbalizes/displays adequate comfort level or baseline comfort level:   Encourage patient to monitor pain and request assistance   Assess pain using appropriate pain scale  2024 by Palma Munoz RN  Outcome: Progressing  2024 by [place] Osorio RN  Outcome: Progressing     Problem: Infection - Adult  Goal: Absence of infection at discharge  2024 by Emiliana Aparicio RN  Outcome: Progressing  2024 by Palma Munoz RN  Outcome: Progressing  2024 by Florida Osorio RN  Outcome: Progressing  Goal: Absence of infection during hospitalization  2024 by Emiliana Aparicio 
  Problem: Vaginal Birth or  Section  Goal: Fetal and maternal status remain reassuring during the birth process  Description:  Birth OB-Pregnancy care plan goal which identifies if the fetal and maternal status remain reassuring during the birth process  2024 by Florida Osorio RN  Outcome: Progressing  2024 by Grace Aaron RN  Outcome: Progressing     Problem: Postpartum  Goal: Experiences normal postpartum course  Description:  Postpartum OB-Pregnancy care plan goal which identifies if the mother is experiencing a normal postpartum course  2024 by Florida Osorio RN  Outcome: Progressing  2024 by Grace Aaron RN  Outcome: Progressing  Goal: Appropriate maternal -  bonding  Description:  Postpartum OB-Pregnancy care plan goal which identifies if the mother and  are bonding appropriately  2024 by Florida Osorio RN  Outcome: Progressing  2024 by Grace Aaron RN  Outcome: Progressing  Goal: Establishment of infant feeding pattern  Description:  Postpartum OB-Pregnancy care plan goal which identifies if the mother is establishing a feeding pattern with their   2024 by Florida Osorio RN  Outcome: Progressing  2024 by Grace Aaron RN  Outcome: Progressing  Goal: Incisions, wounds, or drain sites healing without S/S of infection  2024 by Florida Osorio RN  Outcome: Progressing  2024 by Grace Aaron RN  Outcome: Progressing     Problem: Pain  Goal: Verbalizes/displays adequate comfort level or baseline comfort level  2024 by Florida Osorio RN  Outcome: Progressing  2024 by Grace Aaron RN  Outcome: Progressing     Problem: Infection - Adult  Goal: Absence of infection at discharge  2024 by Florida Osorio RN  Outcome: Progressing  2024 by Grace Aaron RN  Outcome: Progressing  Goal: 
  Problem: Vaginal Birth or  Section  Goal: Fetal and maternal status remain reassuring during the birth process  Description:  Birth OB-Pregnancy care plan goal which identifies if the fetal and maternal status remain reassuring during the birth process  2024 by Palma Munoz RN  Outcome: Completed     Problem: Postpartum  Goal: Experiences normal postpartum course  Description:  Postpartum OB-Pregnancy care plan goal which identifies if the mother is experiencing a normal postpartum course  2024 by Montse Chaudhary RN  Outcome: Progressing  2024 by Emiliana Aparicio RN  Outcome: Progressing  2024 by Palma Munoz RN  Outcome: Progressing  Goal: Appropriate maternal -  bonding  Description:  Postpartum OB-Pregnancy care plan goal which identifies if the mother and  are bonding appropriately  2024 by Montse Chaudhary RN  Outcome: Progressing  2024 by Emiliana Aparicio RN  Outcome: Progressing  2024 by Palma Munoz RN  Outcome: Progressing  Goal: Establishment of infant feeding pattern  Description:  Postpartum OB-Pregnancy care plan goal which identifies if the mother is establishing a feeding pattern with their   2024 by Montse Chaudhary RN  Outcome: Progressing  2024 by Emiliana Aparicio RN  Outcome: Progressing  2024 by Palma Munoz RN  Outcome: Progressing  Goal: Incisions, wounds, or drain sites healing without S/S of infection  Recent Flowsheet Documentation  Taken 2024 by Montse Chaudahry RN  Incisions, Wounds, or Drain Sites Healing Without Sign and Symptoms of Infection: TWICE DAILY: Assess and document skin integrity  2024 by Palma Munoz RN  Outcome: Completed     Problem: Pain  Goal: Verbalizes/displays adequate comfort level or baseline comfort level  2024 by Montse Chaudhary RN  Outcome: Progressing  Flowsheets 
  Problem: Vaginal Birth or  Section  Goal: Fetal and maternal status remain reassuring during the birth process  Description:  Birth OB-Pregnancy care plan goal which identifies if the fetal and maternal status remain reassuring during the birth process  Outcome: Progressing     Problem: Postpartum  Goal: Experiences normal postpartum course  Description:  Postpartum OB-Pregnancy care plan goal which identifies if the mother is experiencing a normal postpartum course  Outcome: Progressing  Goal: Appropriate maternal -  bonding  Description:  Postpartum OB-Pregnancy care plan goal which identifies if the mother and  are bonding appropriately  Outcome: Progressing  Goal: Establishment of infant feeding pattern  Description:  Postpartum OB-Pregnancy care plan goal which identifies if the mother is establishing a feeding pattern with their   Outcome: Progressing  Goal: Incisions, wounds, or drain sites healing without S/S of infection  Outcome: Progressing     Problem: Pain  Goal: Verbalizes/displays adequate comfort level or baseline comfort level  Outcome: Progressing     Problem: Infection - Adult  Goal: Absence of infection at discharge  Outcome: Progressing  Goal: Absence of infection during hospitalization  Outcome: Progressing     Problem: Safety - Adult  Goal: Free from fall injury  Outcome: Progressing     Problem: Discharge Planning  Goal: Discharge to home or other facility with appropriate resources  Outcome: Progressing     
during hospitalization  1/20/2024 0913 by Palma Munoz RN  Outcome: Progressing  1/20/2024 0430 by [place] Osorio RN  Outcome: Progressing  1/20/2024 0423 by Grace Aaron RN  Outcome: Progressing     Problem: Safety - Adult  Goal: Free from fall injury  1/20/2024 0913 by Palma Munoz RN  Outcome: Progressing  1/20/2024 0430 by [place] Osorio RN  Outcome: Progressing  1/20/2024 0423 by Grace Aaron RN  Outcome: Progressing

## 2024-01-21 NOTE — DISCHARGE SUMMARY
Obstetrical Discharge Summary     Name: Soo Leger MRN: [id number]  SSN: [id number]    YOB: 1985  Age: 38 y.o.  Sex: female      Allergies: Patient has no known allergies.    Admit Date: 2024    Discharge Date: 2024     Admitting Physician: Bello Newton MD     Attending Physician:  Reece Parker MD     * Admission Diagnoses: IUP (intrauterine pregnancy), incidental [Z33.1]    * Discharge Diagnoses:   Information for the patient's :  Jon Leger [[id number]]   @[id number]@      Additional Diagnoses:    Lab Results   Component Value Date/Time    ABORH A POSITIVE 2024 09:30 PM    RUBELLAEXT Immune 2019 12:00 AM    There is no immunization history for the selected administration types on file for this patient.    * Procedures:    * No surgery found *    * Discharge Condition: good    * Hospital Course: Normal hospital course following the delivery.    * Disposition: home    Discharge Medications:      Medication List        ASK your doctor about these medications      PRENATAL 1 + IRON PO              * Follow-up Care/Patient Instructions:  Activity: As tolerated  Diet: Regular  Wound Care: none needed  Follow Up: OB provider in 6 weeks    OLI Ibarra, JEREMÍAS  2024

## 2024-01-23 LAB
ABO + RH BLD: NORMAL
BLD PROD TYP BPU: NORMAL
BLD PROD TYP BPU: NORMAL
BLOOD BANK DISPENSE STATUS: NORMAL
BLOOD BANK DISPENSE STATUS: NORMAL
BLOOD GROUP ANTIBODIES SERPL: NORMAL
BPU ID: NORMAL
BPU ID: NORMAL
CROSSMATCH RESULT: NORMAL
CROSSMATCH RESULT: NORMAL
SPECIMEN EXP DATE BLD: NORMAL
UNIT DIVISION: 0
UNIT DIVISION: 0